# Patient Record
Sex: FEMALE | Race: WHITE | URBAN - METROPOLITAN AREA
[De-identification: names, ages, dates, MRNs, and addresses within clinical notes are randomized per-mention and may not be internally consistent; named-entity substitution may affect disease eponyms.]

---

## 2021-07-26 ENCOUNTER — HOSPITAL ENCOUNTER (OUTPATIENT)
Age: 31
Discharge: HOME | End: 2021-07-26
Attending: LEGAL MEDICINE | Admitting: LEGAL MEDICINE
Payer: COMMERCIAL

## 2021-07-26 ENCOUNTER — APPOINTMENT (OUTPATIENT)
Dept: LABOR AND DELIVERY | Age: 31
End: 2021-07-26
Payer: COMMERCIAL

## 2021-07-26 VITALS
DIASTOLIC BLOOD PRESSURE: 79 MMHG | SYSTOLIC BLOOD PRESSURE: 129 MMHG | HEART RATE: 97 BPM | TEMPERATURE: 98 F | RESPIRATION RATE: 16 BRPM

## 2021-07-26 PROCEDURE — 59025 FETAL NON-STRESS TEST: CPT

## 2021-07-29 ENCOUNTER — HOSPITAL ENCOUNTER (OUTPATIENT)
Age: 31
Discharge: HOME | End: 2021-07-29
Attending: LEGAL MEDICINE | Admitting: LEGAL MEDICINE
Payer: COMMERCIAL

## 2021-07-29 ENCOUNTER — APPOINTMENT (OUTPATIENT)
Dept: LABOR AND DELIVERY | Age: 31
End: 2021-07-29
Payer: COMMERCIAL

## 2021-07-29 VITALS
SYSTOLIC BLOOD PRESSURE: 128 MMHG | DIASTOLIC BLOOD PRESSURE: 61 MMHG | TEMPERATURE: 97.8 F | RESPIRATION RATE: 16 BRPM | HEART RATE: 100 BPM

## 2021-07-29 PROCEDURE — 59025 FETAL NON-STRESS TEST: CPT

## 2021-08-02 ENCOUNTER — HOSPITAL ENCOUNTER (OUTPATIENT)
Age: 31
Discharge: HOME | End: 2021-08-02
Attending: LEGAL MEDICINE | Admitting: LEGAL MEDICINE
Payer: COMMERCIAL

## 2021-08-02 VITALS
DIASTOLIC BLOOD PRESSURE: 65 MMHG | HEART RATE: 79 BPM | TEMPERATURE: 98.1 F | RESPIRATION RATE: 18 BRPM | SYSTOLIC BLOOD PRESSURE: 122 MMHG

## 2021-08-02 PROCEDURE — 59025 FETAL NON-STRESS TEST: CPT

## 2021-08-05 ENCOUNTER — HOSPITAL ENCOUNTER (OUTPATIENT)
Age: 31
Discharge: HOME | End: 2021-08-05
Attending: LEGAL MEDICINE | Admitting: LEGAL MEDICINE
Payer: COMMERCIAL

## 2021-08-05 ENCOUNTER — APPOINTMENT (OUTPATIENT)
Dept: LABOR AND DELIVERY | Age: 31
End: 2021-08-05
Payer: COMMERCIAL

## 2021-08-05 VITALS
RESPIRATION RATE: 16 BRPM | DIASTOLIC BLOOD PRESSURE: 80 MMHG | SYSTOLIC BLOOD PRESSURE: 130 MMHG | TEMPERATURE: 98 F | HEART RATE: 85 BPM

## 2021-08-05 PROCEDURE — 59025 FETAL NON-STRESS TEST: CPT

## 2021-08-09 ENCOUNTER — APPOINTMENT (OUTPATIENT)
Dept: LABOR AND DELIVERY | Age: 31
End: 2021-08-09
Payer: COMMERCIAL

## 2021-08-09 ENCOUNTER — ANESTHESIA EVENT (OUTPATIENT)
Dept: LABOR AND DELIVERY | Age: 31
End: 2021-08-09
Payer: COMMERCIAL

## 2021-08-09 ENCOUNTER — ANESTHESIA (OUTPATIENT)
Dept: LABOR AND DELIVERY | Age: 31
End: 2021-08-09
Payer: COMMERCIAL

## 2021-08-09 ENCOUNTER — HOSPITAL ENCOUNTER (INPATIENT)
Age: 31
LOS: 2 days | Discharge: HOME | End: 2021-08-11
Attending: LEGAL MEDICINE | Admitting: LEGAL MEDICINE
Payer: COMMERCIAL

## 2021-08-09 PROBLEM — Z34.90 TERM PREGNANCY: Status: ACTIVE | Noted: 2021-08-09

## 2021-08-09 LAB
ABO/RH: NORMAL
ALBUMIN SERPL-MCNC: 3.4 G/DL (ref 3.5–5.2)
ALP BLD-CCNC: 138 U/L (ref 35–104)
ALT SERPL-CCNC: 7 U/L (ref 0–32)
AMPHETAMINE SCREEN, URINE: NOT DETECTED
ANION GAP SERPL CALCULATED.3IONS-SCNC: 14 MMOL/L (ref 7–16)
ANTIBODY SCREEN: NORMAL
AST SERPL-CCNC: 14 U/L (ref 0–31)
BARBITURATE SCREEN URINE: NOT DETECTED
BENZODIAZEPINE SCREEN, URINE: NOT DETECTED
BILIRUB SERPL-MCNC: <0.2 MG/DL (ref 0–1.2)
BILIRUBIN URINE: NEGATIVE
BLOOD, URINE: NEGATIVE
BUN BLDV-MCNC: 8 MG/DL (ref 6–20)
CALCIUM SERPL-MCNC: 9.8 MG/DL (ref 8.6–10.2)
CANNABINOID SCREEN URINE: NOT DETECTED
CHLORIDE BLD-SCNC: 104 MMOL/L (ref 98–107)
CLARITY: CLEAR
CO2: 20 MMOL/L (ref 22–29)
COCAINE METABOLITE SCREEN URINE: NOT DETECTED
COLOR: YELLOW
CREAT SERPL-MCNC: 0.6 MG/DL (ref 0.5–1)
FENTANYL SCREEN, URINE: NOT DETECTED
GFR AFRICAN AMERICAN: >60
GFR NON-AFRICAN AMERICAN: >60 ML/MIN/1.73
GLUCOSE BLD-MCNC: 73 MG/DL (ref 74–99)
GLUCOSE URINE: NEGATIVE MG/DL
HCT VFR BLD CALC: 34.1 % (ref 34–48)
HEMOGLOBIN: 11.7 G/DL (ref 11.5–15.5)
KETONES, URINE: NEGATIVE MG/DL
LEUKOCYTE ESTERASE, URINE: NEGATIVE
Lab: NORMAL
MCH RBC QN AUTO: 31.5 PG (ref 26–35)
MCHC RBC AUTO-ENTMCNC: 34.3 % (ref 32–34.5)
MCV RBC AUTO: 91.9 FL (ref 80–99.9)
METHADONE SCREEN, URINE: NOT DETECTED
NITRITE, URINE: NEGATIVE
OPIATE SCREEN URINE: NOT DETECTED
OXYCODONE URINE: NOT DETECTED
PDW BLD-RTO: 13.2 FL (ref 11.5–15)
PH UA: 7 (ref 5–9)
PHENCYCLIDINE SCREEN URINE: NOT DETECTED
PLATELET # BLD: 224 E9/L (ref 130–450)
PMV BLD AUTO: 11 FL (ref 7–12)
POTASSIUM SERPL-SCNC: 4 MMOL/L (ref 3.5–5)
PROTEIN UA: NEGATIVE MG/DL
RBC # BLD: 3.71 E12/L (ref 3.5–5.5)
SODIUM BLD-SCNC: 138 MMOL/L (ref 132–146)
SPECIFIC GRAVITY UA: 1.01 (ref 1–1.03)
TOTAL PROTEIN: 6.5 G/DL (ref 6.4–8.3)
UROBILINOGEN, URINE: 0.2 E.U./DL
WBC # BLD: 12.1 E9/L (ref 4.5–11.5)

## 2021-08-09 PROCEDURE — 81003 URINALYSIS AUTO W/O SCOPE: CPT

## 2021-08-09 PROCEDURE — 80053 COMPREHEN METABOLIC PANEL: CPT

## 2021-08-09 PROCEDURE — 86900 BLOOD TYPING SEROLOGIC ABO: CPT

## 2021-08-09 PROCEDURE — 2580000003 HC RX 258: Performed by: LEGAL MEDICINE

## 2021-08-09 PROCEDURE — 88307 TISSUE EXAM BY PATHOLOGIST: CPT

## 2021-08-09 PROCEDURE — 2500000003 HC RX 250 WO HCPCS: Performed by: ANESTHESIOLOGY

## 2021-08-09 PROCEDURE — 80307 DRUG TEST PRSMV CHEM ANLYZR: CPT

## 2021-08-09 PROCEDURE — 1220000001 HC SEMI PRIVATE L&D R&B

## 2021-08-09 PROCEDURE — 3700000025 EPIDURAL BLOCK: Performed by: ANESTHESIOLOGY

## 2021-08-09 PROCEDURE — 86901 BLOOD TYPING SEROLOGIC RH(D): CPT

## 2021-08-09 PROCEDURE — 7200000001 HC VAGINAL DELIVERY

## 2021-08-09 PROCEDURE — 2500000003 HC RX 250 WO HCPCS: Performed by: LEGAL MEDICINE

## 2021-08-09 PROCEDURE — 86850 RBC ANTIBODY SCREEN: CPT

## 2021-08-09 PROCEDURE — 85027 COMPLETE CBC AUTOMATED: CPT

## 2021-08-09 PROCEDURE — 36415 COLL VENOUS BLD VENIPUNCTURE: CPT

## 2021-08-09 RX ORDER — SODIUM CHLORIDE 0.9 % (FLUSH) 0.9 %
5-40 SYRINGE (ML) INJECTION PRN
Status: DISCONTINUED | OUTPATIENT
Start: 2021-08-09 | End: 2021-08-10

## 2021-08-09 RX ORDER — ONDANSETRON 2 MG/ML
4 INJECTION INTRAMUSCULAR; INTRAVENOUS EVERY 6 HOURS PRN
Status: DISCONTINUED | OUTPATIENT
Start: 2021-08-09 | End: 2021-08-09 | Stop reason: SDUPTHER

## 2021-08-09 RX ORDER — SODIUM CHLORIDE, SODIUM LACTATE, POTASSIUM CHLORIDE, AND CALCIUM CHLORIDE .6; .31; .03; .02 G/100ML; G/100ML; G/100ML; G/100ML
500 INJECTION, SOLUTION INTRAVENOUS PRN
Status: DISCONTINUED | OUTPATIENT
Start: 2021-08-09 | End: 2021-08-10

## 2021-08-09 RX ORDER — DEXTROSE, SODIUM CHLORIDE, SODIUM LACTATE, POTASSIUM CHLORIDE, AND CALCIUM CHLORIDE 5; .6; .31; .03; .02 G/100ML; G/100ML; G/100ML; G/100ML; G/100ML
INJECTION, SOLUTION INTRAVENOUS CONTINUOUS
Status: DISCONTINUED | OUTPATIENT
Start: 2021-08-09 | End: 2021-08-10

## 2021-08-09 RX ORDER — ONDANSETRON 2 MG/ML
4 INJECTION INTRAMUSCULAR; INTRAVENOUS EVERY 6 HOURS PRN
Status: DISCONTINUED | OUTPATIENT
Start: 2021-08-09 | End: 2021-08-10

## 2021-08-09 RX ORDER — SODIUM CHLORIDE 0.9 % (FLUSH) 0.9 %
5-40 SYRINGE (ML) INJECTION EVERY 12 HOURS SCHEDULED
Status: DISCONTINUED | OUTPATIENT
Start: 2021-08-09 | End: 2021-08-10

## 2021-08-09 RX ORDER — LEVOTHYROXINE SODIUM 0.05 MG/1
50 TABLET ORAL DAILY
COMMUNITY

## 2021-08-09 RX ORDER — SODIUM CHLORIDE, SODIUM LACTATE, POTASSIUM CHLORIDE, AND CALCIUM CHLORIDE .6; .31; .03; .02 G/100ML; G/100ML; G/100ML; G/100ML
1000 INJECTION, SOLUTION INTRAVENOUS PRN
Status: DISCONTINUED | OUTPATIENT
Start: 2021-08-09 | End: 2021-08-10

## 2021-08-09 RX ORDER — SODIUM CHLORIDE, SODIUM LACTATE, POTASSIUM CHLORIDE, CALCIUM CHLORIDE 600; 310; 30; 20 MG/100ML; MG/100ML; MG/100ML; MG/100ML
INJECTION, SOLUTION INTRAVENOUS CONTINUOUS
Status: DISCONTINUED | OUTPATIENT
Start: 2021-08-09 | End: 2021-08-10

## 2021-08-09 RX ORDER — LIDOCAINE HYDROCHLORIDE 10 MG/ML
30 INJECTION, SOLUTION EPIDURAL; INFILTRATION; INTRACAUDAL; PERINEURAL PRN
Status: DISCONTINUED | OUTPATIENT
Start: 2021-08-09 | End: 2021-08-10

## 2021-08-09 RX ORDER — SODIUM CHLORIDE 9 MG/ML
25 INJECTION, SOLUTION INTRAVENOUS PRN
Status: DISCONTINUED | OUTPATIENT
Start: 2021-08-09 | End: 2021-08-10

## 2021-08-09 RX ORDER — LIDOCAINE HYDROCHLORIDE 10 MG/ML
INJECTION, SOLUTION EPIDURAL; INFILTRATION; INTRACAUDAL; PERINEURAL
Status: DISPENSED
Start: 2021-08-09 | End: 2021-08-09

## 2021-08-09 RX ORDER — NALOXONE HYDROCHLORIDE 0.4 MG/ML
0.4 INJECTION, SOLUTION INTRAMUSCULAR; INTRAVENOUS; SUBCUTANEOUS PRN
Status: DISCONTINUED | OUTPATIENT
Start: 2021-08-09 | End: 2021-08-10

## 2021-08-09 RX ADMIN — SODIUM CHLORIDE, POTASSIUM CHLORIDE, SODIUM LACTATE AND CALCIUM CHLORIDE 1000 ML: 600; 310; 30; 20 INJECTION, SOLUTION INTRAVENOUS at 12:53

## 2021-08-09 RX ADMIN — FAMOTIDINE 20 MG: 10 INJECTION INTRAVENOUS at 15:28

## 2021-08-09 RX ADMIN — Medication 15 ML: at 13:45

## 2021-08-09 RX ADMIN — SODIUM CHLORIDE, POTASSIUM CHLORIDE, SODIUM LACTATE AND CALCIUM CHLORIDE: 600; 310; 30; 20 INJECTION, SOLUTION INTRAVENOUS at 14:15

## 2021-08-09 RX ADMIN — SODIUM CHLORIDE, POTASSIUM CHLORIDE, SODIUM LACTATE AND CALCIUM CHLORIDE 1000 ML: 600; 310; 30; 20 INJECTION, SOLUTION INTRAVENOUS at 11:56

## 2021-08-09 RX ADMIN — SODIUM CHLORIDE, POTASSIUM CHLORIDE, SODIUM LACTATE AND CALCIUM CHLORIDE: 600; 310; 30; 20 INJECTION, SOLUTION INTRAVENOUS at 18:51

## 2021-08-09 RX ADMIN — Medication 15 ML/HR: at 13:55

## 2021-08-09 ASSESSMENT — PAIN SCALES - GENERAL: PAINLEVEL_OUTOF10: 0

## 2021-08-09 NOTE — PROGRESS NOTES
Dr. Kesha Hancock notified that patient is now comfortable with epidural and catheter has been placed. She is 4cm and josé manuel every 2 min. Pts only complaint is heartburn. Orders received for pepcid and no need for pitocin unless contractions space to furthur apart than every 3 minutes.

## 2021-08-09 NOTE — PROGRESS NOTES
Pt is a  who is here with complaints of contractions. She is 39 1/7 weeks pregnant. Denies any leaking of fluid or bleeding. Pt is uncomfortable. Admitted to room 2 and oriented to room.

## 2021-08-09 NOTE — ANESTHESIA PROCEDURE NOTES
Epidural Block    Patient location during procedure: OB  Start time: 8/9/2021 2:35 PM  End time: 8/9/2021 2:50 PM  Reason for block: labor epidural  Staffing  Performed: resident/CRNA   Anesthesiologist: Giovanni Garcia MD  Resident/CRNA: JANNIE Peterson CRNA  Preanesthetic Checklist  Completed: patient identified, IV checked, site marked, risks and benefits discussed, surgical consent, monitors and equipment checked, pre-op evaluation, timeout performed, anesthesia consent given, oxygen available and patient being monitored  Epidural  Patient position: sitting  Prep: Betadine and ChloraPrep  Patient monitoring: cardiac monitor, continuous pulse ox and frequent blood pressure checks  Approach: midline  Location: lumbar (1-5)  Injection technique: KAELA air  Provider prep: mask and sterile gloves  Needle  Needle type: Tuohy   Needle gauge: 18 G  Needle length: 2.5 in  Needle insertion depth: 8 cm  Catheter type: end hole  Catheter size: 18 G (20 G)  Catheter at skin depth: 14 cm  Test dose: negative  Assessment  Hemodynamics: stable  Attempts: 1

## 2021-08-09 NOTE — PROGRESS NOTES
Cervical exam done. Dr. Lauren Carpenter notified of pts arrival, VS, EFM and cervical exam. Orders received for admission.

## 2021-08-09 NOTE — ANESTHESIA PRE PROCEDURE
Department of Anesthesiology  Preprocedure Note       Name:  Charles Griffith   Age:  35 y.o.  :  1990                                          MRN:  [id number]         Date:  2021      Surgeon: * No surgeons listed *    Procedure: * No procedures listed *    Medications prior to admission:   Prior to Admission medications    Medication Sig Start Date End Date Taking?  Authorizing Provider   levothyroxine (SYNTHROID) 50 MCG tablet Take 50 mcg by mouth Daily   Yes Historical Provider, MD       Current medications:    Current Facility-Administered Medications   Medication Dose Route Frequency Provider Last Rate Last Admin    lidocaine PF 1 % injection             dextrose 5 % in lactated ringers infusion   Intravenous Continuous Sarahi Bal MD        lactated ringers infusion   Intravenous Continuous Sarahi Bal  mL/hr at 21 1415 New Bag at 21 1415    lactated ringers bolus  500 mL Intravenous PRN Sarahi Bal MD        Or    lactated ringers bolus  1,000 mL Intravenous PRN Sarahi Bal  mL/hr at 21 1253 1,000 mL at 21 1253    sodium chloride flush 0.9 % injection 5-40 mL  5-40 mL Intravenous 2 times per day Sarahi Bal MD        sodium chloride flush 0.9 % injection 5-40 mL  5-40 mL Intravenous PRN Sarahi Bal MD        0.9 % sodium chloride infusion  25 mL Intravenous PRN Sarahi Bal MD        oxytocin (PITOCIN) 30 units in 500 mL infusion  1 arnulfo-units/min Intravenous Continuous Sarahi Bal MD        oxytocin (PITOCIN) 30 units in 500 mL infusion  10 Units Intravenous PRN Sarahi Bal MD        lidocaine PF 1 % injection 30 mL  30 mL Other PRN Sarahi Bal MD        hydrocortisone 2.5 % cream   Topical Q2H PRN Sarahi Bal MD        ondansetron (ZOFRAN) injection 4 mg  4 mg Intravenous Q6H PRN Sarahi Bal MD        naloxone (NARCAN) injection 0.4 mg  0.4 mg Intravenous PRN Rachel Bautista MD        fentaNYL 1.85mcg/ml and Bupivicaine 0.1% in 0.9% NS 135ml infusion (OB) epidural  15 mL/hr Epidural Continuous Palmer Taylor MD 15 mL/hr at 08/09/21 1355 15 mL/hr at 08/09/21 1355    famotidine (PEPCID) injection 20 mg  20 mg Intravenous BID Sabina Burnham MD   20 mg at 08/09/21 1528       Allergies: Allergies   Allergen Reactions    Alavert [Loratadine]     Pcn [Penicillins] Swelling       Problem List:    Patient Active Problem List   Diagnosis Code    Term pregnancy Z34.90       Past Medical History:  No past medical history on file. Past Surgical History:  No past surgical history on file.     Social History:    Social History     Tobacco Use    Smoking status: Current Every Day Smoker    Smokeless tobacco: Current User   Substance Use Topics    Alcohol use: Not on file                                Ready to quit: Not Answered  Counseling given: Not Answered      Vital Signs (Current):   Vitals:    08/09/21 1530 08/09/21 1600 08/09/21 1630 08/09/21 1700   BP: (!) 112/59 118/65 111/61 129/66   Pulse: 76 75 75 77   Resp: 18 18 18 18   Temp:    98.9 °F (37.2 °C)   SpO2:                                                  BP Readings from Last 3 Encounters:   08/09/21 129/66   08/05/21 130/80   08/02/21 122/65       NPO Status:                                                                                 BMI:   Wt Readings from Last 3 Encounters:   No data found for Wt     There is no height or weight on file to calculate BMI.    CBC:   Lab Results   Component Value Date    WBC 12.1 08/09/2021    RBC 3.71 08/09/2021    HGB 11.7 08/09/2021    HCT 34.1 08/09/2021    MCV 91.9 08/09/2021    RDW 13.2 08/09/2021     08/09/2021       CMP:   Lab Results   Component Value Date     08/09/2021    K 4.0 08/09/2021     08/09/2021    CO2 20 08/09/2021    BUN 8 08/09/2021    CREATININE 0.6 08/09/2021    GFRAA >60 08/09/2021    LABGLOM >60 08/09/2021    GLUCOSE 73 08/09/2021    PROT 6.5 08/09/2021 CALCIUM 9.8 08/09/2021    BILITOT <0.2 08/09/2021    ALKPHOS 138 08/09/2021    AST 14 08/09/2021    ALT 7 08/09/2021       POC Tests: No results for input(s): POCGLU, POCNA, POCK, POCCL, POCBUN, POCHEMO, POCHCT in the last 72 hours. Coags: No results found for: PROTIME, INR, APTT    HCG (If Applicable): No results found for: PREGTESTUR, PREGSERUM, HCG, HCGQUANT     ABGs: No results found for: PHART, PO2ART, KEE8PME, MSX2PBG, BEART, P1CCZMZH     Type & Screen (If Applicable):  No results found for: LABABO, LABRH    Drug/Infectious Status (If Applicable):  No results found for: HIV, HEPCAB    COVID-19 Screening (If Applicable): No results found for: COVID19        Anesthesia Evaluation    Airway: Mallampati: III  TM distance: >3 FB   Neck ROM: full  Mouth opening: > = 3 FB Dental:          Pulmonary: breath sounds clear to auscultation                             Cardiovascular:            Rhythm: regular  Rate: normal                    Neuro/Psych:               GI/Hepatic/Renal:             Endo/Other:                     Abdominal:             Vascular: Other Findings:             Anesthesia Plan      epidural     ASA 2             Anesthetic plan and risks discussed with patient. Plan discussed with CRNA.                   David Espana MD   8/9/2021

## 2021-08-09 NOTE — ANESTHESIA PRE PROCEDURE
Department of Anesthesiology  Preprocedure Note       Name:  Keith Aaron   Age:  35 y.o.  :  1990                                          MRN:  [id number]         Date:  2021      Surgeon: * No surgeons listed *    Procedure: * No procedures listed *    Medications prior to admission:   Prior to Admission medications    Not on File       Current medications:    Current Facility-Administered Medications   Medication Dose Route Frequency Provider Last Rate Last Admin    lidocaine PF 1 % injection             dextrose 5 % in lactated ringers infusion   Intravenous Continuous Venkat Anderson MD        lactated ringers infusion   Intravenous Continuous Venkat Anderson MD        lactated ringers bolus  500 mL Intravenous PRN Venkat Anderson MD        Or    lactated ringers bolus  1,000 mL Intravenous PRN Venkat Anderson  mL/hr at 21 1253 1,000 mL at 21 1253    sodium chloride flush 0.9 % injection 5-40 mL  5-40 mL Intravenous 2 times per day Venkat Anderson MD        sodium chloride flush 0.9 % injection 5-40 mL  5-40 mL Intravenous PRN Venkat Anderson MD        0.9 % sodium chloride infusion  25 mL Intravenous PRN Venkat Anderson MD        oxytocin (PITOCIN) 30 units in 500 mL infusion  1 arnulfo-units/min Intravenous Continuous Venkat Anderson MD        oxytocin (PITOCIN) 30 units in 500 mL infusion  10 Units Intravenous PRN Venkat Anderson MD        And    oxytocin (PITOCIN) 30 units in 500 mL infusion  87.3 arnulfo-units/min Intravenous Continuous PRN Venkat Anderson MD        lidocaine PF 1 % injection 30 mL  30 mL Other PRN Venkat Anderson MD        hydrocortisone 2.5 % cream   Topical Q2H PRN Venkat Anderson MD        ondansetron (ZOFRAN) injection 4 mg  4 mg Intravenous Q6H PRN Venkat Anderson MD           Allergies:  Not on File    Problem List:    Patient Active Problem List   Diagnosis Code    Term pregnancy Z34.90       Past Medical History:  No past medical history on file. Past Surgical History:  No past surgical history on file. Social History:    Social History     Tobacco Use    Smoking status: Current Every Day Smoker    Smokeless tobacco: Current User   Substance Use Topics    Alcohol use: Not on file                                Ready to quit: Not Answered  Counseling given: Not Answered      Vital Signs (Current):   Vitals:    08/09/21 1107   BP: 130/75   Pulse: 80                                              BP Readings from Last 3 Encounters:   08/09/21 130/75   08/05/21 130/80   08/02/21 122/65       NPO Status:                                                                                 BMI:   Wt Readings from Last 3 Encounters:   No data found for Wt     There is no height or weight on file to calculate BMI.    CBC:   Lab Results   Component Value Date    WBC 12.1 08/09/2021    RBC 3.71 08/09/2021    HGB 11.7 08/09/2021    HCT 34.1 08/09/2021    MCV 91.9 08/09/2021    RDW 13.2 08/09/2021     08/09/2021       CMP: No results found for: NA, K, CL, CO2, BUN, CREATININE, GFRAA, AGRATIO, LABGLOM, GLUCOSE, PROT, CALCIUM, BILITOT, ALKPHOS, AST, ALT    POC Tests: No results for input(s): POCGLU, POCNA, POCK, POCCL, POCBUN, POCHEMO, POCHCT in the last 72 hours.     Coags: No results found for: PROTIME, INR, APTT    HCG (If Applicable): No results found for: PREGTESTUR, PREGSERUM, HCG, HCGQUANT     ABGs: No results found for: PHART, PO2ART, PMK5AKU, WSE5IDL, BEART, B9OCZFVH     Type & Screen (If Applicable):  No results found for: LABABO, LABRH    Drug/Infectious Status (If Applicable):  No results found for: HIV, HEPCAB    COVID-19 Screening (If Applicable): No results found for: COVID19        Anesthesia Evaluation  Patient summary reviewed and Nursing notes reviewed  Airway:         Dental: normal exam         Pulmonary:Negative Pulmonary ROS and normal exam                               Cardiovascular:Negative CV ROS                      Neuro/Psych: Negative Neuro/Psych ROS              GI/Hepatic/Renal: Neg GI/Hepatic/Renal ROS            Endo/Other: Negative Endo/Other ROS                    Abdominal:             Vascular: Other Findings:             Anesthesia Plan      epidural     ASA 3                 Plan discussed with attending.                 Cortney Nunez APRN - CRNA   8/9/2021

## 2021-08-10 PROBLEM — Z37.9 NORMAL LABOR: Status: ACTIVE | Noted: 2021-08-10

## 2021-08-10 LAB — HEMOGLOBIN: 10.5 G/DL (ref 11.5–15.5)

## 2021-08-10 PROCEDURE — 85018 HEMOGLOBIN: CPT

## 2021-08-10 PROCEDURE — 1220000001 HC SEMI PRIVATE L&D R&B

## 2021-08-10 PROCEDURE — 36415 COLL VENOUS BLD VENIPUNCTURE: CPT

## 2021-08-10 PROCEDURE — 6370000000 HC RX 637 (ALT 250 FOR IP): Performed by: LEGAL MEDICINE

## 2021-08-10 PROCEDURE — 6370000000 HC RX 637 (ALT 250 FOR IP)

## 2021-08-10 RX ORDER — IBUPROFEN 400 MG/1
TABLET ORAL
Status: COMPLETED
Start: 2021-08-10 | End: 2021-08-10

## 2021-08-10 RX ORDER — FAMOTIDINE 20 MG/1
20 TABLET, FILM COATED ORAL 2 TIMES DAILY
Status: DISCONTINUED | OUTPATIENT
Start: 2021-08-10 | End: 2021-08-11 | Stop reason: HOSPADM

## 2021-08-10 RX ORDER — DOCUSATE SODIUM 100 MG/1
CAPSULE, LIQUID FILLED ORAL
Status: COMPLETED
Start: 2021-08-10 | End: 2021-08-10

## 2021-08-10 RX ORDER — OXYCODONE HYDROCHLORIDE 5 MG/1
5 TABLET ORAL EVERY 4 HOURS PRN
Status: DISCONTINUED | OUTPATIENT
Start: 2021-08-10 | End: 2021-08-11 | Stop reason: HOSPADM

## 2021-08-10 RX ORDER — SODIUM CHLORIDE 0.9 % (FLUSH) 0.9 %
10 SYRINGE (ML) INJECTION EVERY 12 HOURS SCHEDULED
Status: DISCONTINUED | OUTPATIENT
Start: 2021-08-10 | End: 2021-08-11 | Stop reason: HOSPADM

## 2021-08-10 RX ORDER — SODIUM CHLORIDE 0.9 % (FLUSH) 0.9 %
10 SYRINGE (ML) INJECTION PRN
Status: DISCONTINUED | OUTPATIENT
Start: 2021-08-10 | End: 2021-08-11 | Stop reason: HOSPADM

## 2021-08-10 RX ORDER — IBUPROFEN 400 MG/1
400 TABLET ORAL
Status: DISCONTINUED | OUTPATIENT
Start: 2021-08-10 | End: 2021-08-11 | Stop reason: HOSPADM

## 2021-08-10 RX ORDER — LEVOTHYROXINE SODIUM 0.05 MG/1
50 TABLET ORAL DAILY
Status: DISCONTINUED | OUTPATIENT
Start: 2021-08-10 | End: 2021-08-11 | Stop reason: HOSPADM

## 2021-08-10 RX ORDER — FERROUS SULFATE 325(65) MG
325 TABLET ORAL
Status: DISCONTINUED | OUTPATIENT
Start: 2021-08-10 | End: 2021-08-11 | Stop reason: HOSPADM

## 2021-08-10 RX ORDER — ACETAMINOPHEN 500 MG
1000 TABLET ORAL EVERY 6 HOURS PRN
Status: DISCONTINUED | OUTPATIENT
Start: 2021-08-10 | End: 2021-08-11 | Stop reason: HOSPADM

## 2021-08-10 RX ORDER — MODIFIED LANOLIN
OINTMENT (GRAM) TOPICAL
Status: COMPLETED
Start: 2021-08-10 | End: 2021-08-10

## 2021-08-10 RX ORDER — MODIFIED LANOLIN
OINTMENT (GRAM) TOPICAL PRN
Status: DISCONTINUED | OUTPATIENT
Start: 2021-08-10 | End: 2021-08-11 | Stop reason: HOSPADM

## 2021-08-10 RX ORDER — SODIUM CHLORIDE, SODIUM LACTATE, POTASSIUM CHLORIDE, CALCIUM CHLORIDE 600; 310; 30; 20 MG/100ML; MG/100ML; MG/100ML; MG/100ML
INJECTION, SOLUTION INTRAVENOUS CONTINUOUS
Status: DISCONTINUED | OUTPATIENT
Start: 2021-08-10 | End: 2021-08-11 | Stop reason: HOSPADM

## 2021-08-10 RX ORDER — DOCUSATE SODIUM 100 MG/1
100 CAPSULE, LIQUID FILLED ORAL 2 TIMES DAILY
Status: DISCONTINUED | OUTPATIENT
Start: 2021-08-10 | End: 2021-08-11 | Stop reason: HOSPADM

## 2021-08-10 RX ORDER — SODIUM CHLORIDE 9 MG/ML
25 INJECTION, SOLUTION INTRAVENOUS PRN
Status: DISCONTINUED | OUTPATIENT
Start: 2021-08-10 | End: 2021-08-11 | Stop reason: HOSPADM

## 2021-08-10 RX ADMIN — DOCUSATE SODIUM 100 MG: 100 CAPSULE, LIQUID FILLED ORAL at 00:51

## 2021-08-10 RX ADMIN — IBUPROFEN 400 MG: 400 TABLET, FILM COATED ORAL at 00:51

## 2021-08-10 RX ADMIN — DOCUSATE SODIUM 100 MG: 100 CAPSULE, LIQUID FILLED ORAL at 21:12

## 2021-08-10 RX ADMIN — IBUPROFEN 400 MG: 400 TABLET, FILM COATED ORAL at 17:15

## 2021-08-10 RX ADMIN — Medication: at 00:52

## 2021-08-10 RX ADMIN — LEVOTHYROXINE SODIUM 50 MCG: 0.05 TABLET ORAL at 08:16

## 2021-08-10 RX ADMIN — IBUPROFEN 400 MG: 400 TABLET, FILM COATED ORAL at 08:16

## 2021-08-10 RX ADMIN — FAMOTIDINE 20 MG: 20 TABLET ORAL at 21:13

## 2021-08-10 RX ADMIN — DOCUSATE SODIUM 100 MG: 100 CAPSULE, LIQUID FILLED ORAL at 08:16

## 2021-08-10 RX ADMIN — IBUPROFEN 400 MG: 400 TABLET, FILM COATED ORAL at 11:28

## 2021-08-10 RX ADMIN — FAMOTIDINE 20 MG: 20 TABLET ORAL at 11:28

## 2021-08-10 RX ADMIN — IBUPROFEN 400 MG: 400 TABLET, FILM COATED ORAL at 21:13

## 2021-08-10 RX ADMIN — BENZOCAINE AND LEVOMENTHOL: 200; 5 SPRAY TOPICAL at 00:52

## 2021-08-10 ASSESSMENT — PAIN SCALES - GENERAL
PAINLEVEL_OUTOF10: 3
PAINLEVEL_OUTOF10: 5
PAINLEVEL_OUTOF10: 3
PAINLEVEL_OUTOF10: 0
PAINLEVEL_OUTOF10: 0
PAINLEVEL_OUTOF10: 5
PAINLEVEL_OUTOF10: 0
PAINLEVEL_OUTOF10: 5

## 2021-08-10 ASSESSMENT — PAIN - FUNCTIONAL ASSESSMENT: PAIN_FUNCTIONAL_ASSESSMENT: ACTIVITIES ARE NOT PREVENTED

## 2021-08-10 ASSESSMENT — PAIN DESCRIPTION - LOCATION: LOCATION: ABDOMEN

## 2021-08-10 ASSESSMENT — PAIN DESCRIPTION - ONSET: ONSET: GRADUAL

## 2021-08-10 ASSESSMENT — PAIN DESCRIPTION - PROGRESSION: CLINICAL_PROGRESSION: NOT CHANGED

## 2021-08-10 ASSESSMENT — PAIN DESCRIPTION - FREQUENCY: FREQUENCY: INTERMITTENT

## 2021-08-10 ASSESSMENT — PAIN DESCRIPTION - DESCRIPTORS: DESCRIPTORS: ACHING;CRAMPING;DISCOMFORT

## 2021-08-10 ASSESSMENT — PAIN DESCRIPTION - PAIN TYPE: TYPE: ACUTE PAIN

## 2021-08-10 NOTE — PROGRESS NOTES
Recovery completed. Pt ambulated to BR. Showered and voided. Plan of care for night discussed. Pt verbalizes understanding. Medicated per order. Rest encouraged.

## 2021-08-10 NOTE — PROGRESS NOTES
Safe sleep & Shaken Baby videos watched. Verbalized understanding. No questions verbalized when asked.

## 2021-08-10 NOTE — PROGRESS NOTES
Patient actively pushing. RN remain in continuous attendance at bedside. Assessment & evaluation of FHR ongoing via continuous EFM.

## 2021-08-10 NOTE — OP NOTE
[address]                                OPERATIVE REPORT    PATIENT NAME: Jatin Engel                   :        1990  MED REC NO:   [id number]                            ROOM:       02  ACCOUNT NO:   [de-identified]                           ADMIT DATE: 2021. PROVIDER:     Christin Syed MD    DATE OF PROCEDURE:  2021    SURGEON:  Christin Syed MD    The patient felt the urge to push. She was placed in [place]  position. She was able to deliver the fetal head. Fetal trunk was  delivered with minimal traction applied in an axis parallel to the fetal  spine after the shoulder had cleared the pubic bone. Delayed cord  clamping was performed. Nares and hypopharynx were suctioned. Cord was  clamped and cut. Infant was crying and vigorous. Cord gases and blood  samples were obtained. Note was made of moderate meconium. Placenta  was removed spontaneously. Note was made of an intact two artery, one  vein cord placenta which was sent to Pathology. No cervical or vaginal  lacerations were noted. Superficial left labial laceration was  hemostatic and not repaired. Fundus was firm. Estimated blood loss was  300 mL. Bedside exam of the infant revealed no gross abnormalities. The cord arterial gas pH was 7.274, base excess -5. Cord venous gas pH  7.316, base excess -5.7. Pediatrician is Dr. Martín Tate. Mother and  infant went to recovery room in stable condition.         Ebony Lemus MD    D: 2021 22:29:23       T: 2021 22:31:28     [email or web address]  Job#: [id number]     Doc#: [id number]    CC:

## 2021-08-10 NOTE — DISCHARGE SUMMARY
[address]                               DISCHARGE SUMMARY    PATIENT NAME: Sasha Perez                   :        1990  MED REC NO:   [id number]                            ROOM:       0215  ACCOUNT NO:   [de-identified]                           ADMIT DATE: 2021. PROVIDER:     Dannielle Martinez MD                  DISCHARGE DATE:      ADMITTING DIAGNOSIS:  Intrauterine pregnancy at term in labor. DISCHARGE DIAGNOSIS:  Intrauterine pregnancy at term in labor. PROCEDURE PERFORMED:  Vaginal delivery. HOSPITAL COURSE IN DETAIL:  The patient is doing well. Voiding well. Minimal lochia. Wishes to go home tomorrow. Admission labs revealed a  creatinine of 0.6, ALT 7, AST 14, glucose 73. White count 12.1,  hemoglobin 11.7, platelets 634,004. Postpartum day #1 hemoglobin is  10.5. She is afebrile. Heart rate is 80-89. Blood pressure 110-136  over 70-58. Fundus is firm and two fingerbreadths below the umbilicus. Perineum dry and intact. ASSESSMENT:  The patient doing well, postpartum day #1. Stable. Wishes  to go home tomorrow. Denies any PIH symptoms. She did have some  elevated blood pressures of 145/68, 151/73, 151/77 and 144/68 between  2200 to midnight. However, pressures since then have been within normal  limits. She is denying any PIH symptoms. The patient is stable. PLAN:  Home tomorrow if meets discharge criteria with fever, bleeding,  emboli, lifting, climbing, driving precautions. Home with PIH  precautions. She is to follow up at the office in 6 weeks. She  indicates understanding of all instructions.         Aracely Suh MD    D: 08/10/2021 18:48:05       T: 08/10/2021 18:50:11     [email or web address]  Job#: [id number]     Doc#: [id number]    CC:

## 2021-08-10 NOTE — ANESTHESIA POSTPROCEDURE EVALUATION
Department of Anesthesiology  Postprocedure Note    Patient: Osmar Newby  MRN: [id number]  YOB: 1990  Date of evaluation: 8/10/2021  Time:  6:16 AM     Procedure Summary     Date: 08/09/21 Room / Location:     Anesthesia Start: 1335 Anesthesia Stop: 2200    Procedure: Labor Analgesia Diagnosis:     Scheduled Providers:  Responsible Provider: Ravindra Rogers MD    Anesthesia Type: epidural ASA Status: 2          Anesthesia Type: epidural    Mary Phase I:      Mary Phase II: Mary Score: 10    Last vitals: Reviewed and per EMR flowsheets.        Anesthesia Post Evaluation    Patient location during evaluation: bedside  Patient participation: complete - patient participated  Level of consciousness: awake  Pain score: 2  Airway patency: patent  Nausea & Vomiting: no nausea and no vomiting  Complications: no  Cardiovascular status: hemodynamically stable  Respiratory status: acceptable  Hydration status: euvolemic

## 2021-08-10 NOTE — PROGRESS NOTES
Live Viable birth of  BOY via  at 200. Apgars 9/10  Weight: 8lb 0oz  Length: 21'' long    Skin to Skin initiated between mother & baby at 31 75 62. Baby alert, color pink, respirations regular. Patient & SO educated on safe skin to skin practice with proper positioning of baby & mother, Mother's HOB elevated and assurance of unobstructed airway. Verbalized understanding with no questions asked.

## 2021-08-10 NOTE — PROGRESS NOTES
Baby boy born at 200 . Apgars 9 /10 . Weight  8Lb 0 Oz. Spontaneous respirations & lusty cry. Reviewed with patient plan of care during recovery period. Instructed not to get out of bed without staff and the lingering effects of the epidural.  Verbalized understanding. No questions voiced when asked. Skin to Skin initiated between mother & baby. Baby alert, color pink, respirations regular. Patient & SO educated on safe skin to skin practice with proper positioning of baby & mother, maintain mother's HOB elevated and assurance of unobstructed airway. Verbalized understanding with no questions asked.

## 2021-08-10 NOTE — PLAN OF CARE
Lactation Consultant phone number given to patient for any post-discharge questions or concerns and Labor & Delivery phone number given if Lactation Consultant not available.   NATE PaulaN, RN, IBCLC, CCCE

## 2021-08-10 NOTE — PROGRESS NOTES
Assumed care of pt for 11-7 shift. First contact with pt. Plan of care for night discussed. Pt verbalizes understanding. Recovery period continues. Skin to skin continues.

## 2021-08-10 NOTE — PLAN OF CARE
Problem: Fluid Volume - Imbalance:  Goal: Absence of imbalanced fluid volume signs and symptoms  Description: Absence of imbalanced fluid volume signs and symptoms  8/10/2021 0819 by Shiv Wall RN  Outcome: Met This Shift     Problem: Fluid Volume - Imbalance:  Goal: Absence of postpartum hemorrhage signs and symptoms  Description: Absence of postpartum hemorrhage signs and symptoms  8/10/2021 0819 by Shiv Wall RN  Outcome: Met This Shift     Problem: Infection - Risk of, Puerperal Infection:  Goal: Will show no infection signs and symptoms  Description: Will show no infection signs and symptoms  8/10/2021 0819 by Shiv Wall RN  Outcome: Met This Shift     Problem: Mood - Altered:  Goal: Mood stable  Description: Mood stable  8/10/2021 0819 by Shiv Wall RN  Outcome: Met This Shift     Problem: Pain - Acute:  Goal: Pain level will decrease  Description: Pain level will decrease  8/10/2021 0819 by Shiv Wall RN  Outcome: Met This Shift     Problem: Breastfeeding - Ineffective:  Goal: Infant able to latch onto breast  Description: Infant able to latch onto breast  Outcome: Met This Shift     Problem: Breastfeeding - Ineffective:  Goal: Intact skin on mother's nipple  Description: Intact skin on mother's nipple  Outcome: Met This Shift     Problem: Breastfeeding - Ineffective:  Goal: Uninterrupted skin-to-skin contact during initial breast-feeding if medically possible  Description: Uninterrupted skin-to-skin contact during initial breast-feeding if medically possible  Outcome: Met This Shift     Problem: Breastfeeding - Ineffective:  Goal: Urine and stool output as expected for age  Description: Urine and stool output as expected for age  Outcome: Met This Shift     Problem: Breastfeeding - Ineffective:  Goal: Weight loss within specified parameters for age  Description: Weight loss within specified parameters for age  Outcome: Met This Shift     Problem: Discharge Planning:  Goal: Discharged to appropriate level of care  Description: Discharged to appropriate level of care  8/10/2021 0819 by Leeann Mobley RN  Outcome: Ongoing     Problem: Constipation:  Goal: Bowel elimination is within specified parameters  Description: Bowel elimination is within specified parameters  8/10/2021 0819 by Leeann Mobley RN  Outcome: Ongoing

## 2021-08-11 VITALS
OXYGEN SATURATION: 97 % | HEIGHT: 66 IN | TEMPERATURE: 97.7 F | BODY MASS INDEX: 29.73 KG/M2 | HEART RATE: 71 BPM | RESPIRATION RATE: 17 BRPM | DIASTOLIC BLOOD PRESSURE: 78 MMHG | SYSTOLIC BLOOD PRESSURE: 120 MMHG | WEIGHT: 185 LBS

## 2021-08-11 PROCEDURE — 90471 IMMUNIZATION ADMIN: CPT | Performed by: LEGAL MEDICINE

## 2021-08-11 PROCEDURE — 6360000002 HC RX W HCPCS: Performed by: LEGAL MEDICINE

## 2021-08-11 PROCEDURE — 90715 TDAP VACCINE 7 YRS/> IM: CPT | Performed by: LEGAL MEDICINE

## 2021-08-11 PROCEDURE — 6370000000 HC RX 637 (ALT 250 FOR IP): Performed by: LEGAL MEDICINE

## 2021-08-11 RX ADMIN — DOCUSATE SODIUM 100 MG: 100 CAPSULE, LIQUID FILLED ORAL at 08:17

## 2021-08-11 RX ADMIN — IBUPROFEN 400 MG: 400 TABLET, FILM COATED ORAL at 08:17

## 2021-08-11 RX ADMIN — FAMOTIDINE 20 MG: 20 TABLET ORAL at 08:17

## 2021-08-11 RX ADMIN — TETANUS TOXOID, REDUCED DIPHTHERIA TOXOID AND ACELLULAR PERTUSSIS VACCINE, ADSORBED 0.5 ML: 5; 2.5; 8; 8; 2.5 SUSPENSION INTRAMUSCULAR at 11:28

## 2021-08-11 RX ADMIN — IBUPROFEN 400 MG: 400 TABLET, FILM COATED ORAL at 11:28

## 2021-08-11 RX ADMIN — LEVOTHYROXINE SODIUM 50 MCG: 0.05 TABLET ORAL at 07:52

## 2021-08-11 ASSESSMENT — PAIN SCALES - GENERAL
PAINLEVEL_OUTOF10: 2
PAINLEVEL_OUTOF10: 2

## 2021-08-11 ASSESSMENT — PAIN DESCRIPTION - PROGRESSION: CLINICAL_PROGRESSION: NOT CHANGED

## 2021-08-11 NOTE — PROGRESS NOTES
[place] returned to mother. Circumcision care explained and all questions answered. MOB informed to call if assistance needed. Call light in reach. Verbalizes understanding.

## 2021-08-11 NOTE — PROGRESS NOTES
Assumed care of patient at [address] [place] [address] with bedside report. Patient asleep, family visiting, call light within reach. White board updated.

## 2021-08-11 NOTE — PROGRESS NOTES
Postpartum care and  care teaching given to maternal patient. AWHONN post-birth warning signs reviewed with patient. Patient viewed both safe sleep and shaken baby videos, and education given on both. TDaP vaccine given, education including CDC handout given. Patient verbalized understanding of all above education with no questions. Discharge instructions given to patient, who verbalized understanding with no questions; signed attestation page to soft chart.

## 2023-06-07 LAB — PAP SMEAR: NORMAL

## 2023-07-26 NOTE — H&P
[address]                              HISTORY AND PHYSICAL    PATIENT NAME: Nessa Garcia                   :        1990  MED REC NO:   [id number]                            ROOM:       LD02  ACCOUNT NO:   [de-identified]                           ADMIT DATE: 2021. PROVIDER:     Jomar Griffin MD      HISTORY OF PRESENT ILLNESS:  The patient is a 67-year-old white female  presented on 2021 with complaints of regular uterine contractions  for several hours. No leaking fluid or vaginal bleeding. No change in  fetal movements. No change in her bowel or urinary habits. No viral  prodrome. No vulvar lesions. Group B Strep is negative. For her past medical, surgical, GYN, social, family history, please  refer to ACOG forms A and B. REVIEW OF SYSTEMS:  Negative for cardiac, respiratory, GI,   abnormalities. No PIH symptoms. PHYSICAL EXAMINATION:  VITAL SIGNS:  Stable. Blood pressure 112/59, pulse 76, respiratory rate  18, temperature 98.9, O2 sat 99% on room air. PELVIC:  Fetal heart tones are present in the 140s with exvi-vs-ebzl  variability present. Accelerations noted. Contractions are every 2  minutes. Estimated fetal weight is 3500 gm. Cervix is complete vertex,  0 station with no palpable bag of fluid. EXTREMITIES:  No clubbing, cyanosis. 2+ edema. NEURO:  CN II through XII are grossly intact. She is alert and oriented  x4. ASSESSMENT:  Intrauterine pregnancy at term in labor. PLAN:  Risks of vaginal delivery and operative delivery have been  reviewed with her. Indications for operative delivery have been  reviewed with her. Shoulder dystocia and birth trauma have been  reviewed with her. All her questions have been answered and she wishes  to proceed with attempt at vaginal delivery.           Alison Dennison MD    D: 2021 17:07:20       T: 2021 17:09:30     [email or web address]  Job#: [id number]     Doc#: [id number]    CC: Klisyri Pregnancy And Lactation Text: It is unknown if this medication can harm a developing fetus or if it is excreted in breast milk.

## 2023-08-15 ENCOUNTER — ROUTINE PRENATAL (OUTPATIENT)
Age: 33
End: 2023-08-15

## 2023-08-15 VITALS
HEART RATE: 82 BPM | DIASTOLIC BLOOD PRESSURE: 67 MMHG | WEIGHT: 164.9 LBS | HEIGHT: 65 IN | BODY MASS INDEX: 27.47 KG/M2 | SYSTOLIC BLOOD PRESSURE: 101 MMHG

## 2023-08-15 DIAGNOSIS — Z3A.12 12 WEEKS GESTATION OF PREGNANCY: Primary | ICD-10-CM

## 2023-08-15 DIAGNOSIS — E03.9 HYPOTHYROIDISM, UNSPECIFIED TYPE: ICD-10-CM

## 2023-08-15 PROCEDURE — 99902 PR PRENATAL VISIT: CPT | Performed by: LEGAL MEDICINE

## 2023-08-15 RX ORDER — ONDANSETRON 4 MG/1
4 TABLET, ORALLY DISINTEGRATING ORAL 2 TIMES DAILY PRN
Qty: 14 TABLET | Refills: 5 | Status: SHIPPED | OUTPATIENT
Start: 2023-08-15 | End: 2023-08-17 | Stop reason: SDUPTHER

## 2023-08-15 NOTE — PATIENT INSTRUCTIONS
Patient Education        Learning About Birth Defects Testing  What is birth defects testing? Birth defects testing is done during pregnancy to look for possible problems with the baby (fetus). A birth defect may have only a minor impact on a child's life. Or it can have a major effect on quality or length of life. You and your doctor can choose from many tests. You may have no tests, one test, or many tests. What are the types of tests? You may have a screening test, a diagnostic test, or both. Screening tests show the chance that a baby has a certain birth defect, such as Down syndrome, spina bifida, or trisomy 25. Diagnostic tests show if a baby has a certain birth defect. Screening tests and when they're done:  Blood tests at 10 to 13 weeks (first trimester)  Cell-free fetal DNA test at 10 weeks or later (first trimester)  Nuchal translucency test at 10 to 13 weeks (first trimester)  Quad screening at 15 to 22 weeks (second trimester)  Ultrasound at 18 to 22 weeks (second trimester)  Diagnostic tests and when they're done:  Chorionic villus sampling ([facility]) at 10 to 13 weeks (first trimester)  Amniocentesis at 15 to 20 weeks (second trimester)  Sometimes doctors will look at the combined screenings that you've had over a period of time. This is called an integrated screening. What are the screening tests? Blood tests are done to look at different substances in your blood. These tests include cell free fetal DNA and quadruple (quad) blood tests. Both of these tests can help find genetic problems. Nuchal translucency test uses ultrasound to measure the thickness of the area at the back of the baby's neck. An increase in thickness can be an early sign of certain birth defects. Ultrasound is a tool that uses sound waves to make pictures of your baby and placenta inside the uterus. Ultrasound lets your doctor see an image of your baby.  It can help your doctor look for problems of the heart, spine, belly, or

## 2023-08-15 NOTE — PROGRESS NOTES
HYPOTHYROID  H/O TOB AND ETOH  PAP 23 6.4 WKS EDC 3/1/24      X 1    12.1W    The patient is a 23-year-old white female  2 para 1 EDC 3/1/2024 by ultrasound on 2023 at 6.4 weeks. She has a history of hypothyroidism which is well controlled. She had a spontaneous vaginal delivery previously in . She is complaining of some nausea. She had some leftover Zofran that she used in the past and that has helped her. She has requested a refill on the Zofran. She is also constipated. She is using stool softeners with assistance with that entity as well. She declines NIPT testing, cystic fibrosis testing, or  spinal muscle atrophy testing. She is agreeable to OB ultrasound for evaluation for any anatomic abnormalities. The dose on her levothyroxine is 50 mcg. Reviewed need for TSH on an every trimester basis. The last TSH was normal about a month ago. TSH at 16 weeks and 32 weeks will be ordered. That will be for  and January 3. She reviewed the genetic screening form. She denies any family history of autism. She denies any family history of genetic  disorders. Abdomen is soft and nondistended and nontender. Fetal heart tones are present in the 160s. Prenatal labs and level 2 ultrasound were ordered. She is to follow-up at the office in 4 weeks. She indicates understanding of all instructions. Information regarding pregnancy diet exercise and vaccinations was provided. Prescription for Zofran was provided. Risks of birth defects with medications was reviewed.

## 2023-08-16 DIAGNOSIS — E03.9 HYPOTHYROIDISM, UNSPECIFIED TYPE: ICD-10-CM

## 2023-08-16 DIAGNOSIS — Z3A.12 12 WEEKS GESTATION OF PREGNANCY: ICD-10-CM

## 2023-08-17 RX ORDER — ONDANSETRON 4 MG/1
4 TABLET, ORALLY DISINTEGRATING ORAL 2 TIMES DAILY PRN
Qty: 14 TABLET | Refills: 5 | Status: SHIPPED | OUTPATIENT
Start: 2023-08-17

## 2023-08-18 LAB
CULTURE: NORMAL
SPECIMEN DESCRIPTION: NORMAL

## 2023-08-29 ENCOUNTER — ANCILLARY PROCEDURE (OUTPATIENT)
Dept: OBGYN CLINIC | Age: 33
End: 2023-08-29
Payer: COMMERCIAL

## 2023-08-29 ENCOUNTER — INITIAL PRENATAL (OUTPATIENT)
Dept: OBGYN CLINIC | Age: 33
End: 2023-08-29
Payer: COMMERCIAL

## 2023-08-29 VITALS
DIASTOLIC BLOOD PRESSURE: 74 MMHG | BODY MASS INDEX: 27.67 KG/M2 | HEART RATE: 91 BPM | SYSTOLIC BLOOD PRESSURE: 110 MMHG | WEIGHT: 166.25 LBS

## 2023-08-29 DIAGNOSIS — E03.9 HYPOTHYROIDISM, UNSPECIFIED TYPE: ICD-10-CM

## 2023-08-29 DIAGNOSIS — Z3A.13 13 WEEKS GESTATION OF PREGNANCY: Primary | ICD-10-CM

## 2023-08-29 PROCEDURE — 99203 OFFICE O/P NEW LOW 30 MIN: CPT | Performed by: OBSTETRICS & GYNECOLOGY

## 2023-08-29 PROCEDURE — 4004F PT TOBACCO SCREEN RCVD TLK: CPT | Performed by: OBSTETRICS & GYNECOLOGY

## 2023-08-29 PROCEDURE — G8419 CALC BMI OUT NRM PARAM NOF/U: HCPCS | Performed by: OBSTETRICS & GYNECOLOGY

## 2023-08-29 PROCEDURE — 81002 URINALYSIS NONAUTO W/O SCOPE: CPT | Performed by: OBSTETRICS & GYNECOLOGY

## 2023-08-29 PROCEDURE — G8427 DOCREV CUR MEDS BY ELIG CLIN: HCPCS | Performed by: OBSTETRICS & GYNECOLOGY

## 2023-08-29 PROCEDURE — 76805 OB US >/= 14 WKS SNGL FETUS: CPT | Performed by: OBSTETRICS & GYNECOLOGY

## 2023-09-16 SDOH — ECONOMIC STABILITY: TRANSPORTATION INSECURITY
IN THE PAST 12 MONTHS, HAS LACK OF TRANSPORTATION KEPT YOU FROM MEETINGS, WORK, OR FROM GETTING THINGS NEEDED FOR DAILY LIVING?: NO

## 2023-09-16 SDOH — ECONOMIC STABILITY: FOOD INSECURITY: WITHIN THE PAST 12 MONTHS, YOU WORRIED THAT YOUR FOOD WOULD RUN OUT BEFORE YOU GOT MONEY TO BUY MORE.: NEVER TRUE

## 2023-09-16 SDOH — ECONOMIC STABILITY: FOOD INSECURITY: WITHIN THE PAST 12 MONTHS, THE FOOD YOU BOUGHT JUST DIDN'T LAST AND YOU DIDN'T HAVE MONEY TO GET MORE.: NEVER TRUE

## 2023-09-16 SDOH — ECONOMIC STABILITY: HOUSING INSECURITY
IN THE LAST 12 MONTHS, WAS THERE A TIME WHEN YOU DID NOT HAVE A STEADY PLACE TO SLEEP OR SLEPT IN A SHELTER (INCLUDING NOW)?: NO

## 2023-09-16 SDOH — ECONOMIC STABILITY: INCOME INSECURITY: HOW HARD IS IT FOR YOU TO PAY FOR THE VERY BASICS LIKE FOOD, HOUSING, MEDICAL CARE, AND HEATING?: NOT VERY HARD

## 2023-09-19 ENCOUNTER — ROUTINE PRENATAL (OUTPATIENT)
Age: 33
End: 2023-09-19
Payer: COMMERCIAL

## 2023-09-19 VITALS
WEIGHT: 170 LBS | SYSTOLIC BLOOD PRESSURE: 114 MMHG | DIASTOLIC BLOOD PRESSURE: 68 MMHG | HEART RATE: 83 BPM | BODY MASS INDEX: 28.29 KG/M2

## 2023-09-19 DIAGNOSIS — Z3A.16 16 WEEKS GESTATION OF PREGNANCY: Primary | ICD-10-CM

## 2023-09-19 LAB
APPEARANCE FLUID: CLEAR
BILIRUBIN, POC: NORMAL
BLOOD URINE, POC: NORMAL
CLARITY, POC: NORMAL
COLOR, POC: NORMAL
GLUCOSE URINE, POC: NORMAL
KETONES, POC: NORMAL
LEUKOCYTE EST, POC: NORMAL
NITRITE, POC: NEGATIVE
PH, POC: NORMAL
PROTEIN, POC: NEGATIVE
SPECIFIC GRAVITY, POC: NORMAL
UROBILINOGEN, POC: NORMAL

## 2023-09-19 PROCEDURE — 81002 URINALYSIS NONAUTO W/O SCOPE: CPT | Performed by: LEGAL MEDICINE

## 2023-09-19 PROCEDURE — 99902 PR PRENATAL VISIT: CPT | Performed by: LEGAL MEDICINE

## 2023-09-19 ASSESSMENT — PATIENT HEALTH QUESTIONNAIRE - PHQ9
SUM OF ALL RESPONSES TO PHQ QUESTIONS 1-9: 0
1. LITTLE INTEREST OR PLEASURE IN DOING THINGS: 0
SUM OF ALL RESPONSES TO PHQ QUESTIONS 1-9: 0
SUM OF ALL RESPONSES TO PHQ9 QUESTIONS 1 & 2: 0
SUM OF ALL RESPONSES TO PHQ QUESTIONS 1-9: 0
SUM OF ALL RESPONSES TO PHQ QUESTIONS 1-9: 0
2. FEELING DOWN, DEPRESSED OR HOPELESS: 0

## 2023-09-21 LAB
CULTURE: NORMAL
SPECIMEN DESCRIPTION: NORMAL

## 2023-09-22 ENCOUNTER — TELEPHONE (OUTPATIENT)
Age: 33
End: 2023-09-22

## 2023-09-22 NOTE — TELEPHONE ENCOUNTER
Patient called and wanted to know if you knew anything as to how much blood was in her urine. She said she was told there was blood in her urine and she was concerned as to why since she is pregnant.

## 2023-09-27 ENCOUNTER — TELEPHONE (OUTPATIENT)
Age: 33
End: 2023-09-27

## 2023-09-28 DIAGNOSIS — Z3A.17 17 WEEKS GESTATION OF PREGNANCY: Primary | ICD-10-CM

## 2023-09-28 RX ORDER — LEVOTHYROXINE SODIUM 0.05 MG/1
50 TABLET ORAL DAILY
Qty: 90 TABLET | Refills: 1 | Status: SHIPPED | OUTPATIENT
Start: 2023-09-28

## 2023-09-28 NOTE — PROGRESS NOTES
Synthroid (levothyroxine) 50 mcg p.o. daily #90 with 1 refill sent to [address] [place] [address] in [facility] per patient request.  She has a TSH ordered at 32 weeks.

## 2023-10-06 ENCOUNTER — TELEPHONE (OUTPATIENT)
Dept: ADMINISTRATIVE | Age: 33
End: 2023-10-06

## 2023-10-06 NOTE — TELEPHONE ENCOUNTER
Pt is 20 weeks pregnant, has lower abdominal pain, pt also has anxiety and wants to be seen today by provider. Please call pt back. Thanks!

## 2023-10-06 NOTE — TELEPHONE ENCOUNTER
Spoke with patient concerning abdominal pain. Patient stated that the pain is very low to little now. She was just concerned that it was 2 days in a row and that she has an anatomy scan on Monday, stated that if she has any more questions or concerns after that appointment then she will call and make an appointment with this Provider.

## 2023-10-09 ENCOUNTER — ANCILLARY PROCEDURE (OUTPATIENT)
Dept: OBGYN CLINIC | Age: 33
End: 2023-10-09
Payer: COMMERCIAL

## 2023-10-09 ENCOUNTER — ROUTINE PRENATAL (OUTPATIENT)
Dept: OBGYN CLINIC | Age: 33
End: 2023-10-09
Payer: COMMERCIAL

## 2023-10-09 ENCOUNTER — HOSPITAL ENCOUNTER (OUTPATIENT)
Age: 33
Discharge: HOME | End: 2023-10-09
Payer: COMMERCIAL

## 2023-10-09 VITALS
SYSTOLIC BLOOD PRESSURE: 108 MMHG | DIASTOLIC BLOOD PRESSURE: 64 MMHG | WEIGHT: 174.25 LBS | BODY MASS INDEX: 29 KG/M2 | HEART RATE: 94 BPM

## 2023-10-09 DIAGNOSIS — E03.8 OTHER SPECIFIED HYPOTHYROIDISM: ICD-10-CM

## 2023-10-09 DIAGNOSIS — E03.9 HYPOTHYROIDISM, UNSPECIFIED TYPE: ICD-10-CM

## 2023-10-09 DIAGNOSIS — Z34.90 SECOND PREGNANCY: ICD-10-CM

## 2023-10-09 DIAGNOSIS — Z3A.19 19 WEEKS GESTATION OF PREGNANCY: Primary | ICD-10-CM

## 2023-10-09 PROBLEM — Z37.9 NORMAL LABOR: Status: RESOLVED | Noted: 2021-08-10 | Resolved: 2023-10-09

## 2023-10-09 LAB — TSH SERPL DL<=0.05 MIU/L-ACNC: 1.4 UIU/ML (ref 0.27–4.2)

## 2023-10-09 PROCEDURE — 81002 URINALYSIS NONAUTO W/O SCOPE: CPT | Performed by: OBSTETRICS & GYNECOLOGY

## 2023-10-09 PROCEDURE — 76811 OB US DETAILED SNGL FETUS: CPT | Performed by: OBSTETRICS & GYNECOLOGY

## 2023-10-09 PROCEDURE — 99999 PR OFFICE/OUTPT VISIT,PROCEDURE ONLY: CPT | Performed by: OBSTETRICS & GYNECOLOGY

## 2023-10-09 PROCEDURE — 99213 OFFICE O/P EST LOW 20 MIN: CPT | Performed by: OBSTETRICS & GYNECOLOGY

## 2023-10-09 PROCEDURE — 36415 COLL VENOUS BLD VENIPUNCTURE: CPT

## 2023-10-09 PROCEDURE — 82105 ALPHA-FETOPROTEIN SERUM: CPT

## 2023-10-09 PROCEDURE — 84443 ASSAY THYROID STIM HORMONE: CPT

## 2023-10-09 NOTE — PROGRESS NOTES
[address] [facility] FETAL MEDICINE  [address] [address]  Dept: [phone]  Loc: [phone]     10/9/2023    RE:  Rachel Benavides     : 1990   AGE: 29 y.o. Dear Dr. Johan Gomez,    Thank you for allowing me to see Rachel Benavides. As I'm sure you will recall, Rachelflores Benavides is a 29 y.o. [id number]'s last menstrual period was 2023 (exact date). Estimated Date of Delivery: 3/1/24 at 19w3d seen in our office today for the following:    REASON FOR VISIT: Level II    Patient Active Problem List    Diagnosis Date Noted    Second pregnancy 10/09/2023    Hypothyroid 08/15/2023    19 weeks gestation of pregnancy 2021        PAST HISTORY:  OB History    Para Term  AB Living   2 1 1     1   SAB IAB Ectopic Molar Multiple Live Births           0 1      # Outcome Date GA Lbr Koko/2nd Weight Sex Delivery Anes PTL Lv   2 Current            1 Term 21 39w1d 11:15 / 02:15 8 lb (3.629 kg) M Vag-Spont EPI  MARLIN          MEDICAL:  Past Medical History:   Diagnosis Date    Hypothyroidism         SURGICAL:  Past Surgical History:   Procedure Laterality Date    TONSILLECTOMY N/A        ALLERGIES:    Allergies   Allergen Reactions    Alavert [Loratadine]     Pcn [Penicillins] Swelling         MEDICATIONS:    Current Outpatient Medications   Medication Sig Dispense Refill    levothyroxine (SYNTHROID) 50 MCG tablet Take 1 tablet by mouth daily 90 tablet 1    Prenatal MV-Min-Fe Fum-FA-DHA (PRENATAL 1 PO) Take by mouth      levothyroxine (SYNTHROID) 50 MCG tablet Take 1 tablet by mouth Daily      ondansetron (ZOFRAN-ODT) 4 MG disintegrating tablet Take 1 tablet by mouth 2 times daily as needed for Nausea or Vomiting Risk of birth defects reviewed. (Patient not taking: Reported on 10/9/2023) 14 tablet 5     No current facility-administered medications for this visit.         Social History     Socioeconomic History

## 2023-10-10 NOTE — RESULT ENCOUNTER NOTE
Please call patient 10/10/23. Report shows normal TSH. Let me know if she needs a refill on her thyroid medication.               Thank you

## 2023-10-11 ENCOUNTER — TELEPHONE (OUTPATIENT)
Age: 33
End: 2023-10-11

## 2023-10-11 NOTE — TELEPHONE ENCOUNTER
----- Message from Robb Garcia MD sent at 10/10/2023  9:00 AM EDT -----  Please call patient 10/10/23. Report shows normal TSH. Let me know if she needs a refill on her thyroid medication.               Thank you

## 2023-10-12 LAB
AFP INTERPRETATION: NORMAL
AFP MOM: 1.15
AFP SPECIMEN: NORMAL
AFP: 57 NG/ML
DATE OF BIRTH: NORMAL
DATING METHOD: NORMAL
DETERMINED BY: NORMAL
DIABETIC: NORMAL
DONOR EGG?: NORMAL
DUE DATE: NORMAL
ESTIMATED DUE DATE: NORMAL
FAMILY HISTORY NTD: NORMAL
GESTATIONAL AGE: NORMAL
IN VITRO FERTILIZATION: NORMAL
INSULIN REQ DIABETES: NO
LAST MENSTRUAL PERIOD: NORMAL
MATERNAL AGE AT EDD: 32.5 YR
MATERNAL WEIGHT: NORMAL
MONOCHORIONIC TWINS: NORMAL
NUMBER OF FETUSES: NORMAL
PATIENT WEIGHT UNITS: NORMAL
PATIENT WEIGHT: NORMAL
RACE (MATERNAL): NORMAL
RACE: NORMAL
REPEAT SPECIMEN?: NORMAL
SMOKING: NO
SMOKING: NORMAL
VALPROIC/CARBAMAZEP: NORMAL
ZZ NTE CLEAN UP: HISTORY: NO

## 2023-10-31 ENCOUNTER — ROUTINE PRENATAL (OUTPATIENT)
Age: 33
End: 2023-10-31
Payer: COMMERCIAL

## 2023-10-31 VITALS
SYSTOLIC BLOOD PRESSURE: 116 MMHG | WEIGHT: 171 LBS | BODY MASS INDEX: 28.46 KG/M2 | DIASTOLIC BLOOD PRESSURE: 75 MMHG | HEART RATE: 86 BPM

## 2023-10-31 DIAGNOSIS — Z3A.22 22 WEEKS GESTATION OF PREGNANCY: Primary | ICD-10-CM

## 2023-10-31 LAB
APPEARANCE FLUID: CLEAR
BILIRUBIN, POC: NORMAL
BLOOD URINE, POC: NORMAL
CLARITY, POC: NORMAL
COLOR, POC: NORMAL
GLUCOSE URINE, POC: NEGATIVE
KETONES, POC: NORMAL
LEUKOCYTE EST, POC: NEGATIVE
NITRITE, POC: NEGATIVE
PH, POC: NORMAL
PROTEIN, POC: NEGATIVE
SPECIFIC GRAVITY, POC: NORMAL
UROBILINOGEN, POC: NORMAL

## 2023-10-31 PROCEDURE — 81002 URINALYSIS NONAUTO W/O SCOPE: CPT | Performed by: LEGAL MEDICINE

## 2023-10-31 PROCEDURE — 99902 PR PRENATAL VISIT: CPT | Performed by: LEGAL MEDICINE

## 2023-11-13 ENCOUNTER — HOSPITAL ENCOUNTER (EMERGENCY)
Age: 33
Discharge: HOME | End: 2023-11-13
Attending: EMERGENCY MEDICINE
Payer: COMMERCIAL

## 2023-11-13 ENCOUNTER — HOSPITAL ENCOUNTER (OUTPATIENT)
Age: 33
Discharge: HOME | End: 2023-11-13
Payer: COMMERCIAL

## 2023-11-13 ENCOUNTER — APPOINTMENT (OUTPATIENT)
Dept: ULTRASOUND IMAGING | Age: 33
End: 2023-11-13
Payer: COMMERCIAL

## 2023-11-13 VITALS
RESPIRATION RATE: 16 BRPM | TEMPERATURE: 97.8 F | DIASTOLIC BLOOD PRESSURE: 58 MMHG | BODY MASS INDEX: 28.12 KG/M2 | OXYGEN SATURATION: 97 % | SYSTOLIC BLOOD PRESSURE: 115 MMHG | WEIGHT: 175 LBS | HEART RATE: 82 BPM | HEIGHT: 66 IN

## 2023-11-13 DIAGNOSIS — Z3A.22 22 WEEKS GESTATION OF PREGNANCY: ICD-10-CM

## 2023-11-13 DIAGNOSIS — Z3A.19 19 WEEKS GESTATION OF PREGNANCY: ICD-10-CM

## 2023-11-13 DIAGNOSIS — E03.9 HYPOTHYROIDISM, UNSPECIFIED TYPE: ICD-10-CM

## 2023-11-13 DIAGNOSIS — Z3A.12 12 WEEKS GESTATION OF PREGNANCY: ICD-10-CM

## 2023-11-13 DIAGNOSIS — Z3A.16 16 WEEKS GESTATION OF PREGNANCY: ICD-10-CM

## 2023-11-13 DIAGNOSIS — R55 SYNCOPE AND COLLAPSE: Primary | ICD-10-CM

## 2023-11-13 LAB
ABO + RH BLD: NORMAL
ALBUMIN SERPL-MCNC: 3.7 G/DL (ref 3.5–5.2)
ALP SERPL-CCNC: 74 U/L (ref 35–104)
ALT SERPL-CCNC: 6 U/L (ref 0–32)
ANION GAP SERPL CALCULATED.3IONS-SCNC: 8 MMOL/L (ref 7–16)
ARM BAND NUMBER: NORMAL
AST SERPL-CCNC: 10 U/L (ref 0–31)
BASOPHILS # BLD: 0.03 K/UL (ref 0–0.2)
BASOPHILS NFR BLD: 0 % (ref 0–2)
BILIRUB SERPL-MCNC: <0.2 MG/DL (ref 0–1.2)
BLOOD BANK SAMPLE EXPIRATION: NORMAL
BLOOD GROUP ANTIBODIES SERPL: NEGATIVE
BUN SERPL-MCNC: 7 MG/DL (ref 6–20)
CALCIUM SERPL-MCNC: 8.9 MG/DL (ref 8.6–10.2)
CHLORIDE SERPL-SCNC: 103 MMOL/L (ref 98–107)
CO2 SERPL-SCNC: 25 MMOL/L (ref 22–29)
CREAT SERPL-MCNC: 0.5 MG/DL (ref 0.5–1)
EOSINOPHIL # BLD: 0.1 K/UL (ref 0.05–0.5)
EOSINOPHILS RELATIVE PERCENT: 1 % (ref 0–6)
ERYTHROCYTE [DISTWIDTH] IN BLOOD BY AUTOMATED COUNT: 13.4 % (ref 11.5–15)
GFR SERPL CREATININE-BSD FRML MDRD: >60 ML/MIN/1.73M2
GLUCOSE SERPL-MCNC: 80 MG/DL (ref 74–99)
HCT VFR BLD AUTO: 34.2 % (ref 34–48)
HGB BLD-MCNC: 11.4 G/DL (ref 11.5–15.5)
IMM GRANULOCYTES # BLD AUTO: 0.08 K/UL (ref 0–0.58)
IMM GRANULOCYTES NFR BLD: 1 % (ref 0–5)
LYMPHOCYTES NFR BLD: 2.41 K/UL (ref 1.5–4)
LYMPHOCYTES RELATIVE PERCENT: 25 % (ref 20–42)
MCH RBC QN AUTO: 31.2 PG (ref 26–35)
MCHC RBC AUTO-ENTMCNC: 33.3 G/DL (ref 32–34.5)
MCV RBC AUTO: 93.7 FL (ref 80–99.9)
MONOCYTES NFR BLD: 0.64 K/UL (ref 0.1–0.95)
MONOCYTES NFR BLD: 7 % (ref 2–12)
NEUTROPHILS NFR BLD: 66 % (ref 43–80)
NEUTS SEG NFR BLD: 6.35 K/UL (ref 1.8–7.3)
PLATELET # BLD AUTO: 268 K/UL (ref 130–450)
PMV BLD AUTO: 10 FL (ref 7–12)
POTASSIUM SERPL-SCNC: 3.9 MMOL/L (ref 3.5–5)
PROT SERPL-MCNC: 6.4 G/DL (ref 6.4–8.3)
RBC # BLD AUTO: 3.65 M/UL (ref 3.5–5.5)
SODIUM SERPL-SCNC: 136 MMOL/L (ref 132–146)
TSH SERPL DL<=0.05 MIU/L-ACNC: 3.67 UIU/ML (ref 0.27–4.2)
WBC OTHER # BLD: 9.6 K/UL (ref 4.5–11.5)

## 2023-11-13 PROCEDURE — 86762 RUBELLA ANTIBODY: CPT

## 2023-11-13 PROCEDURE — 84702 CHORIONIC GONADOTROPIN TEST: CPT

## 2023-11-13 PROCEDURE — 80053 COMPREHEN METABOLIC PANEL: CPT

## 2023-11-13 PROCEDURE — 86850 RBC ANTIBODY SCREEN: CPT

## 2023-11-13 PROCEDURE — 86787 VARICELLA-ZOSTER ANTIBODY: CPT

## 2023-11-13 PROCEDURE — 86592 SYPHILIS TEST NON-TREP QUAL: CPT

## 2023-11-13 PROCEDURE — 82677 ASSAY OF ESTRIOL: CPT

## 2023-11-13 PROCEDURE — 82105 ALPHA-FETOPROTEIN SERUM: CPT

## 2023-11-13 PROCEDURE — 84443 ASSAY THYROID STIM HORMONE: CPT

## 2023-11-13 PROCEDURE — 99284 EMERGENCY DEPT VISIT MOD MDM: CPT

## 2023-11-13 PROCEDURE — 87389 HIV-1 AG W/HIV-1&-2 AB AG IA: CPT

## 2023-11-13 PROCEDURE — 93005 ELECTROCARDIOGRAM TRACING: CPT | Performed by: STUDENT IN AN ORGANIZED HEALTH CARE EDUCATION/TRAINING PROGRAM

## 2023-11-13 PROCEDURE — 86900 BLOOD TYPING SEROLOGIC ABO: CPT

## 2023-11-13 PROCEDURE — 86336 INHIBIN A: CPT

## 2023-11-13 PROCEDURE — 86803 HEPATITIS C AB TEST: CPT

## 2023-11-13 PROCEDURE — 76805 OB US >/= 14 WKS SNGL FETUS: CPT

## 2023-11-13 PROCEDURE — 2580000003 HC RX 258: Performed by: STUDENT IN AN ORGANIZED HEALTH CARE EDUCATION/TRAINING PROGRAM

## 2023-11-13 PROCEDURE — 86901 BLOOD TYPING SEROLOGIC RH(D): CPT

## 2023-11-13 PROCEDURE — 85025 COMPLETE CBC W/AUTO DIFF WBC: CPT

## 2023-11-13 PROCEDURE — 87340 HEPATITIS B SURFACE AG IA: CPT

## 2023-11-13 RX ORDER — 0.9 % SODIUM CHLORIDE 0.9 %
1000 INTRAVENOUS SOLUTION INTRAVENOUS ONCE
Status: COMPLETED | OUTPATIENT
Start: 2023-11-13 | End: 2023-11-13

## 2023-11-13 RX ORDER — SODIUM CHLORIDE 0.9 % (FLUSH) 0.9 %
SYRINGE (ML) INJECTION
Status: DISCONTINUED
Start: 2023-11-13 | End: 2023-11-13 | Stop reason: HOSPADM

## 2023-11-13 RX ADMIN — SODIUM CHLORIDE 1000 ML: 9 INJECTION, SOLUTION INTRAVENOUS at 11:16

## 2023-11-13 ASSESSMENT — LIFESTYLE VARIABLES
HOW MANY STANDARD DRINKS CONTAINING ALCOHOL DO YOU HAVE ON A TYPICAL DAY: PATIENT DOES NOT DRINK
HOW OFTEN DO YOU HAVE A DRINK CONTAINING ALCOHOL: NEVER

## 2023-11-13 NOTE — CODE DOCUMENTATION
Upon arrival to RRT patient sitting in lab chair, face pale. Patient given water at this time. Per  patient passed out during lab draw. Patient now speaking with physician.

## 2023-11-13 NOTE — CODE DOCUMENTATION
Patient agreeable to go to ED to be evaluated and get IV fluids. Patient family present at this time and given update.

## 2023-11-13 NOTE — ED PROVIDER NOTES
[address]      Pt Name: Levar Combs  MRN: [id number]  [address] [facility] [place] 1990  Date of evaluation: 11/13/2023  Provider: Nilesh Daniel DO  PCP: Kristy Sosa DO  Note Started: 10:55 AM EST 11/13/23    CHIEF COMPLAINT       Chief Complaint   Patient presents with    Loss of Consciousness     While in outpatient lab       HISTORY OF PRESENT ILLNESS: 1 or more Elements   History From: Patient  Limitations to history : None    Levar Combs is a 29 y.o. female who presents to the ED following a syncopal episode. Patient is currently 6 months pregnant and was scheduled to get her fasting glucose tolerance test this morning. While obtaining lab work in the outpatient setting she had a syncopal episode. A rapid response was called and patient was found to be pale and slightly somnolent. She reportedly was syncopal for only a few seconds prior to regaining consciousness. She denies any chest pain or shortness of breath associated with the incident. She denies any associated abdominal pain. She was offered further evaluation in the ED and accepted as she wanted to be sure that her baby was healthy. Blood glucose was tested following the syncopal episode and was found to be 102. She has no focal neurologic deficits on examination. Nursing Notes were all reviewed and agreed with or any disagreements were addressed in the HPI. REVIEW OF SYSTEMS :    Positives and Pertinent negatives as per HPI.      SURGICAL HISTORY     Past Surgical History:   Procedure Laterality Date    TONSILLECTOMY      TONSILLECTOMY N/A        CURRENTMEDICATIONS       Discharge Medication List as of 11/13/2023  2:48 PM        CONTINUE these medications which have NOT CHANGED    Details   levothyroxine (SYNTHROID) 50 MCG tablet Take 1 tablet by mouth daily, Disp-90 tablet, R-1Normal      Prenatal MV-Min-Fe Fum-FA-DHA (PRENATAL 1 PO) Take by

## 2023-11-14 LAB
HBV SURFACE AG SERPL QL IA: NONREACTIVE
HCV AB SERPL QL IA: NONREACTIVE
HIV 1+2 AB+HIV1 P24 AG SERPL QL IA: NONREACTIVE
RPR SER QL: NONREACTIVE
VZV IGG SER QL IA: NORMAL

## 2023-11-15 LAB
AFP INTERPRETATION: NORMAL
AFP MOM: 1.43
AFP QUAD INTERPRETATION: NORMAL
AFP SPECIMEN: NORMAL
AFP: 160 NG/ML
DATE OF BIRTH: NORMAL
DATING METHOD: NORMAL
DETERMINED BY: NORMAL
DIABETIC: NO
DIMERIC INHIBIN A: 299 PG/ML
DONOR EGG?: NO
DUE DATE: NORMAL
EKG ATRIAL RATE: 73 BPM
EKG P AXIS: 41 DEGREES
EKG P-R INTERVAL: 142 MS
EKG Q-T INTERVAL: 374 MS
EKG QRS DURATION: 86 MS
EKG QTC CALCULATION (BAZETT): 412 MS
EKG R AXIS: 40 DEGREES
EKG T AXIS: 11 DEGREES
EKG VENTRICULAR RATE: 73 BPM
ESTIMATED DUE DATE: NORMAL
FAMILY HISTORY NTD: NO
GESTATIONAL AGE: NORMAL
HX OF HEREDITARY DISORDERS: NO
IN VITRO FERTILIZATION: NO
INHIBIN A MOM: 1.11
INSULIN REQ DIABETES: NO
LAST MENSTRUAL PERIOD: NORMAL
MATERNAL AGE AT EDD: 32.5 YR
MATERNAL WEIGHT: 170
MOM FOR HCG, 2ND TRIMESTER: 1.2
MONOCHORIONIC TWINS: NO
NUMBER OF FETUSES: NORMAL
PATIENT WEIGHT UNITS: NORMAL
PATIENT WEIGHT: NORMAL
PATIENT'S HCG, TRI 2: NORMAL IU/L
PREV TRISOMY PREG: NO
RACE (MATERNAL): NORMAL
RACE: NORMAL
REPEAT SPECIMEN?: NO
RUBV IGG SERPL QL IA: NORMAL IU/ML
SMOKING: NO
SMOKING: NO
UE3 MOM: 0.45
UE3 VALUE: 1.65 NG/ML
VALPROIC/CARBAMAZEP: NO
ZZ NTE CLEAN UP: HISTORY: NO

## 2023-11-15 PROCEDURE — 93010 ELECTROCARDIOGRAM REPORT: CPT | Performed by: INTERNAL MEDICINE

## 2023-11-20 ENCOUNTER — TELEPHONE (OUTPATIENT)
Age: 33
End: 2023-11-20

## 2023-11-20 ENCOUNTER — HOSPITAL ENCOUNTER (OUTPATIENT)
Age: 33
Discharge: HOME | End: 2023-11-20
Payer: COMMERCIAL

## 2023-11-20 DIAGNOSIS — Z3A.25 25 WEEKS GESTATION OF PREGNANCY: Primary | ICD-10-CM

## 2023-11-20 DIAGNOSIS — Z3A.25 25 WEEKS GESTATION OF PREGNANCY: ICD-10-CM

## 2023-11-20 LAB
AMOUNT GLUCOSE GIVEN: 50 G
COLLECT TIME, 1HR GLUCOSE: 1414
GLUCOSE 3H P 100 G GLC PO SERPL-MCNC: 179 MG/DL

## 2023-11-20 PROCEDURE — 36415 COLL VENOUS BLD VENIPUNCTURE: CPT

## 2023-11-20 PROCEDURE — 82947 ASSAY GLUCOSE BLOOD QUANT: CPT

## 2023-11-20 NOTE — RESULT ENCOUNTER NOTE
Please call patient. 11/20/2023    Report shows elevated glucose tolerance test.  Will need 3-hour glucose tolerance test.  Order was placed in epic.             Thank you

## 2023-11-20 NOTE — RESULT ENCOUNTER NOTE
Please call patient. 11/20/2023    Report shows elevated 1 hour glucose tolerance test.  Will need a 3-hour glucose tolerance test.  Refer this was placed.               Thank you

## 2023-11-21 ENCOUNTER — TELEPHONE (OUTPATIENT)
Age: 33
End: 2023-11-21

## 2023-11-21 NOTE — TELEPHONE ENCOUNTER
----- Message from Maira Lujan MD sent at 11/20/2023  4:25 PM EST -----  Please call patient. 11/20/2023    Report shows elevated 1 hour glucose tolerance test.  Will need a 3-hour glucose tolerance test.  Refer this was placed.               Thank you

## 2023-11-27 ENCOUNTER — TELEPHONE (OUTPATIENT)
Age: 33
End: 2023-11-27

## 2023-11-27 DIAGNOSIS — Z3A.26 26 WEEKS GESTATION OF PREGNANCY: Primary | ICD-10-CM

## 2023-11-27 NOTE — TELEPHONE ENCOUNTER
When she went to take her one hour glucola, the ER dr had told her to eat as she had a syncopal episode.  She wants to retake the 1-hr as she was not fasting, before she attempts the 3 hour GTT

## 2023-12-01 ENCOUNTER — HOSPITAL ENCOUNTER (OUTPATIENT)
Age: 33
Discharge: HOME | End: 2023-12-01
Payer: COMMERCIAL

## 2023-12-01 DIAGNOSIS — Z3A.26 26 WEEKS GESTATION OF PREGNANCY: ICD-10-CM

## 2023-12-01 LAB
AMOUNT GLUCOSE GIVEN: 50 G
COLLECT TIME, 1HR GLUCOSE: NORMAL
GLUCOSE 3H P 100 G GLC PO SERPL-MCNC: 102 MG/DL

## 2023-12-01 PROCEDURE — 36415 COLL VENOUS BLD VENIPUNCTURE: CPT

## 2023-12-01 PROCEDURE — 82947 ASSAY GLUCOSE BLOOD QUANT: CPT

## 2023-12-01 NOTE — RESULT ENCOUNTER NOTE
Please call patient.   12/1/2023    Report shows normal repeat glucose tolerance test      Thank you

## 2023-12-04 ENCOUNTER — ROUTINE PRENATAL (OUTPATIENT)
Age: 33
End: 2023-12-04
Payer: COMMERCIAL

## 2023-12-04 VITALS
WEIGHT: 178.2 LBS | DIASTOLIC BLOOD PRESSURE: 72 MMHG | HEIGHT: 65 IN | BODY MASS INDEX: 29.69 KG/M2 | SYSTOLIC BLOOD PRESSURE: 112 MMHG | HEART RATE: 95 BPM

## 2023-12-04 DIAGNOSIS — Z3A.27 27 WEEKS GESTATION OF PREGNANCY: Primary | ICD-10-CM

## 2023-12-04 LAB
BILIRUBIN, POC: NORMAL
BLOOD URINE, POC: NEGATIVE
CLARITY, POC: CLEAR
COLOR, POC: NORMAL
GLUCOSE URINE, POC: NEGATIVE
KETONES, POC: NORMAL
LEUKOCYTE EST, POC: 1
NITRITE, POC: NEGATIVE
PH, POC: NORMAL
PROTEIN, POC: NORMAL
SPECIFIC GRAVITY, POC: NORMAL
UROBILINOGEN, POC: NORMAL

## 2023-12-04 PROCEDURE — 99902 PR PRENATAL VISIT: CPT | Performed by: LEGAL MEDICINE

## 2023-12-04 PROCEDURE — 81002 URINALYSIS NONAUTO W/O SCOPE: CPT | Performed by: LEGAL MEDICINE

## 2023-12-04 NOTE — PATIENT INSTRUCTIONS
Your Tdap vaccine is due between 27 weeks to 31 weeks. RSV vaccine is due at 36 weeks. Flu vaccine and COVID-vaccine can be done at any time. You may have the vaccines given here in the office, or at your pharmacy.

## 2023-12-06 ENCOUNTER — TELEPHONE (OUTPATIENT)
Age: 33
End: 2023-12-06

## 2023-12-06 NOTE — TELEPHONE ENCOUNTER
----- Message from Rachel Lorenzo MD sent at 12/1/2023 12:14 PM EST -----  Please call patient.   12/1/2023    Report shows normal repeat glucose tolerance test      Thank you

## 2023-12-27 NOTE — PROGRESS NOTES
Glenny Ware    Patient presents for routine prenatal visit today at 31.5 weeks.  Has had a 4 pound weight gain since her last visit.  Total weight gain is 18 pounds.  Would like the Tdap vaccine.    Denies complaints  Denies VB, or cramping  Feeling movement at this time. Reviewed above.    /77   Pulse 78   Wt 82.6 kg (182 lb)   LMP 05/17/2023 (Exact Date)   BMI 30.29 kg/m²      Fundal height is 36 cm.  Fetal heart tones are present 140s.  Counseled on importance of TDAP for PT and S.O and other caregivers of infant.  Tdap injection given IM left arm.  All questions and concerns addressed at this time      ICD-10-CM    1. 31 weeks gestation of pregnancy  Z3A.31 Hemoglobin and Hematocrit     TSH           Return in about 2 weeks (around 1/17/2024) for FOLLOW UP.    Denisse Valdivia MD

## 2024-01-03 ENCOUNTER — ROUTINE PRENATAL (OUTPATIENT)
Age: 34
End: 2024-01-03
Payer: COMMERCIAL

## 2024-01-03 ENCOUNTER — ROUTINE PRENATAL (OUTPATIENT)
Dept: OBGYN CLINIC | Age: 34
End: 2024-01-03
Payer: COMMERCIAL

## 2024-01-03 ENCOUNTER — ANCILLARY PROCEDURE (OUTPATIENT)
Dept: OBGYN CLINIC | Age: 34
End: 2024-01-03
Payer: COMMERCIAL

## 2024-01-03 VITALS
DIASTOLIC BLOOD PRESSURE: 77 MMHG | WEIGHT: 182 LBS | HEART RATE: 78 BPM | SYSTOLIC BLOOD PRESSURE: 110 MMHG | BODY MASS INDEX: 30.29 KG/M2

## 2024-01-03 VITALS
BODY MASS INDEX: 30.29 KG/M2 | SYSTOLIC BLOOD PRESSURE: 90 MMHG | HEART RATE: 88 BPM | DIASTOLIC BLOOD PRESSURE: 59 MMHG | WEIGHT: 182 LBS

## 2024-01-03 DIAGNOSIS — Z3A.31 31 WEEKS GESTATION OF PREGNANCY: Primary | ICD-10-CM

## 2024-01-03 DIAGNOSIS — E03.9 HYPOTHYROIDISM, UNSPECIFIED TYPE: ICD-10-CM

## 2024-01-03 DIAGNOSIS — O26.13 LOW WEIGHT GAIN DURING PREGNANCY IN THIRD TRIMESTER: ICD-10-CM

## 2024-01-03 LAB
GLUCOSE URINE, POC: NORMAL
PROTEIN UA: NEGATIVE

## 2024-01-03 PROCEDURE — 81002 URINALYSIS NONAUTO W/O SCOPE: CPT | Performed by: OBSTETRICS & GYNECOLOGY

## 2024-01-03 PROCEDURE — 90715 TDAP VACCINE 7 YRS/> IM: CPT | Performed by: LEGAL MEDICINE

## 2024-01-03 PROCEDURE — 90471 IMMUNIZATION ADMIN: CPT | Performed by: LEGAL MEDICINE

## 2024-01-03 PROCEDURE — 99213 OFFICE O/P EST LOW 20 MIN: CPT | Performed by: OBSTETRICS & GYNECOLOGY

## 2024-01-03 PROCEDURE — 99999 PR OFFICE/OUTPT VISIT,PROCEDURE ONLY: CPT | Performed by: OBSTETRICS & GYNECOLOGY

## 2024-01-03 PROCEDURE — 99902 PR PRENATAL VISIT: CPT | Performed by: LEGAL MEDICINE

## 2024-01-03 PROCEDURE — 76816 OB US FOLLOW-UP PER FETUS: CPT | Performed by: OBSTETRICS & GYNECOLOGY

## 2024-01-03 NOTE — PROGRESS NOTES
Patient is here today for 4 wk f/u. Denies any vaginal bleeding, cramping, or leakage of fluids.  Patient reports good fetal movement.

## 2024-01-03 NOTE — PROGRESS NOTES
[facility]  [address] [place], [place] [address] [address]  Dept: [phone]  Loc: [phone]     1/3/2024    RE:  Glenny Ware     : 1990   AGE: 33 y.o.     Dear Dr. Valdivia,    Thank you for allowing me to see Glenny Ware.  As I'm sure you will recall, Glenny Ware is a 33 y.o. [id number]'s last menstrual period was 2023 (exact date). Estimated Date of Delivery: 3/1/24 at 31w5d seen in our office today for the following:    REASON FOR VISIT: Growth     Patient Active Problem List    Diagnosis Date Noted    Second pregnancy 10/09/2023    Hypothyroid 08/15/2023    31 weeks gestation of pregnancy 2021        PAST HISTORY:  OB History    Para Term  AB Living   2 1 1 0 0 1   SAB IAB Ectopic Molar Multiple Live Births   0 0 0 0   1      # Outcome Date GA Lbr Koko/2nd Weight Sex Delivery Anes PTL Lv   2 Current            1 Term 21 39w1d 11:15 / 02:15 3.629 kg (8 lb) M Vag-Spont EPI  [place]          MEDICAL:  Past Medical History:   Diagnosis Date    Hypothyroidism         SURGICAL:  Past Surgical History:   Procedure Laterality Date    TONSILLECTOMY      TONSILLECTOMY N/A        ALLERGIES:    Allergies   Allergen Reactions    Alavert [Loratadine]     Pcn [Penicillins]     Pcn [Penicillins] Swelling         MEDICATIONS:    Current Outpatient Medications   Medication Sig Dispense Refill    levothyroxine (SYNTHROID) 50 MCG tablet Take 1 tablet by mouth daily 90 tablet 1    Prenatal MV-Min-Fe Fum-FA-DHA (PRENATAL 1 PO) Take by mouth      ondansetron (ZOFRAN-ODT) 4 MG disintegrating tablet Take 1 tablet by mouth 2 times daily as needed for Nausea or Vomiting Risk of birth defects reviewed. (Patient not taking: Reported on 10/9/2023) 14 tablet 5     No current facility-administered medications for this visit.        Social History     Socioeconomic History    Marital status:    Tobacco Use

## 2024-01-10 ENCOUNTER — HOSPITAL ENCOUNTER (OUTPATIENT)
Age: 34
Discharge: HOME | End: 2024-01-10
Attending: LEGAL MEDICINE | Admitting: LEGAL MEDICINE
Payer: COMMERCIAL

## 2024-01-10 VITALS
HEART RATE: 90 BPM | DIASTOLIC BLOOD PRESSURE: 67 MMHG | OXYGEN SATURATION: 99 % | BODY MASS INDEX: 30.32 KG/M2 | HEIGHT: 65 IN | RESPIRATION RATE: 18 BRPM | WEIGHT: 182 LBS | SYSTOLIC BLOOD PRESSURE: 114 MMHG | TEMPERATURE: 98 F

## 2024-01-10 PROBLEM — Z3A.32 32 WEEKS GESTATION OF PREGNANCY: Status: ACTIVE | Noted: 2024-01-10

## 2024-01-10 LAB
ALBUMIN SERPL-MCNC: 3.4 G/DL (ref 3.5–5.2)
ALP SERPL-CCNC: 96 U/L (ref 35–104)
ALT SERPL-CCNC: 9 U/L (ref 0–32)
ANION GAP SERPL CALCULATED.3IONS-SCNC: 10 MMOL/L (ref 7–16)
AST SERPL-CCNC: 14 U/L (ref 0–31)
BACTERIA URNS QL MICRO: ABNORMAL
BASOPHILS # BLD: 0.03 K/UL (ref 0–0.2)
BASOPHILS NFR BLD: 0 % (ref 0–2)
BILIRUB SERPL-MCNC: 0.3 MG/DL (ref 0–1.2)
BILIRUB UR QL STRIP: NEGATIVE
BUN SERPL-MCNC: 5 MG/DL (ref 6–20)
CALCIUM SERPL-MCNC: 8.7 MG/DL (ref 8.6–10.2)
CHLORIDE SERPL-SCNC: 105 MMOL/L (ref 98–107)
CLARITY UR: ABNORMAL
CO2 SERPL-SCNC: 21 MMOL/L (ref 22–29)
COLOR UR: YELLOW
CREAT SERPL-MCNC: 0.6 MG/DL (ref 0.5–1)
EKG ATRIAL RATE: 88 BPM
EKG P AXIS: 55 DEGREES
EKG P-R INTERVAL: 132 MS
EKG Q-T INTERVAL: 354 MS
EKG QRS DURATION: 84 MS
EKG QTC CALCULATION (BAZETT): 428 MS
EKG R AXIS: 25 DEGREES
EKG T AXIS: 26 DEGREES
EKG VENTRICULAR RATE: 88 BPM
EOSINOPHIL # BLD: 0.06 K/UL (ref 0.05–0.5)
EOSINOPHILS RELATIVE PERCENT: 1 % (ref 0–6)
EPI CELLS #/AREA URNS HPF: ABNORMAL /HPF
ERYTHROCYTE [DISTWIDTH] IN BLOOD BY AUTOMATED COUNT: 13.4 % (ref 11.5–15)
GFR SERPL CREATININE-BSD FRML MDRD: >60 ML/MIN/1.73M2
GLUCOSE SERPL-MCNC: 78 MG/DL (ref 74–99)
GLUCOSE UR STRIP-MCNC: NEGATIVE MG/DL
HCT VFR BLD AUTO: 32.8 % (ref 34–48)
HGB BLD-MCNC: 11 G/DL (ref 11.5–15.5)
HGB UR QL STRIP.AUTO: NEGATIVE
IMM GRANULOCYTES # BLD AUTO: 0.11 K/UL (ref 0–0.58)
IMM GRANULOCYTES NFR BLD: 1 % (ref 0–5)
KETONES UR STRIP-MCNC: NEGATIVE MG/DL
LEUKOCYTE ESTERASE UR QL STRIP: ABNORMAL
LYMPHOCYTES NFR BLD: 1.84 K/UL (ref 1.5–4)
LYMPHOCYTES RELATIVE PERCENT: 18 % (ref 20–42)
MCH RBC QN AUTO: 31.3 PG (ref 26–35)
MCHC RBC AUTO-ENTMCNC: 33.5 G/DL (ref 32–34.5)
MCV RBC AUTO: 93.2 FL (ref 80–99.9)
MONOCYTES NFR BLD: 0.74 K/UL (ref 0.1–0.95)
MONOCYTES NFR BLD: 7 % (ref 2–12)
NEUTROPHILS NFR BLD: 72 % (ref 43–80)
NEUTS SEG NFR BLD: 7.27 K/UL (ref 1.8–7.3)
NITRITE UR QL STRIP: POSITIVE
PH UR STRIP: 7 [PH] (ref 5–9)
PLATELET # BLD AUTO: 218 K/UL (ref 130–450)
PMV BLD AUTO: 10.3 FL (ref 7–12)
POTASSIUM SERPL-SCNC: 4.2 MMOL/L (ref 3.5–5)
PROT SERPL-MCNC: 5.9 G/DL (ref 6.4–8.3)
PROT UR STRIP-MCNC: 30 MG/DL
RBC # BLD AUTO: 3.52 M/UL (ref 3.5–5.5)
RBC #/AREA URNS HPF: ABNORMAL /HPF
SODIUM SERPL-SCNC: 136 MMOL/L (ref 132–146)
SP GR UR STRIP: 1.01 (ref 1–1.03)
UROBILINOGEN UR STRIP-ACNC: 0.2 EU/DL (ref 0–1)
WBC #/AREA URNS HPF: ABNORMAL /HPF
WBC OTHER # BLD: 10.1 K/UL (ref 4.5–11.5)

## 2024-01-10 PROCEDURE — 85025 COMPLETE CBC W/AUTO DIFF WBC: CPT

## 2024-01-10 PROCEDURE — 99202 OFFICE O/P NEW SF 15 MIN: CPT

## 2024-01-10 PROCEDURE — 6370000000 HC RX 637 (ALT 250 FOR IP): Performed by: LEGAL MEDICINE

## 2024-01-10 PROCEDURE — 81001 URINALYSIS AUTO W/SCOPE: CPT

## 2024-01-10 PROCEDURE — 80053 COMPREHEN METABOLIC PANEL: CPT

## 2024-01-10 PROCEDURE — 87086 URINE CULTURE/COLONY COUNT: CPT

## 2024-01-10 PROCEDURE — 93005 ELECTROCARDIOGRAM TRACING: CPT | Performed by: LEGAL MEDICINE

## 2024-01-10 PROCEDURE — 93010 ELECTROCARDIOGRAM REPORT: CPT | Performed by: INTERNAL MEDICINE

## 2024-01-10 RX ORDER — NITROFURANTOIN 25; 75 MG/1; MG/1
100 CAPSULE ORAL ONCE
Status: COMPLETED | OUTPATIENT
Start: 2024-01-10 | End: 2024-01-10

## 2024-01-10 RX ADMIN — NITROFURANTOIN MONOHYDRATE/MACROCRYSTALLINE 100 MG: 25; 75 CAPSULE ORAL at 16:53

## 2024-01-10 NOTE — PROGRESS NOTES
Dr. Valdivia updated on patient status and EFM. Dr. Valdivia reviewed results remotely. Orders to send urine culture and give one time dose of 100 mg of Macrobid po and discharge patient after.

## 2024-01-10 NOTE — H&P
Department of Obstetrics and Gynecology  Attending Obstetrics History and Physical        CHIEF COMPLAINT: Presyncope    HISTORY OF PRESENT ILLNESS:      33-year-old white female  2 para 1 at 32 weeks and 5 days gestation presents on 1/10/2024 with complaints of 2 episodes of presyncope.  Lasted approximately 10 minutes.  She was sitting and having her nails done during the last episode today.  People around her noted that she was very pale, her lips were blue color.  She had eaten a granola bar and some yogurt before this happened.  Did not lose consciousness.  No  seizure activity.  No chest pain or shortness of breath.  No headaches.  Had some blurred vision during the episode.  No diaphoresis.  Denies orthopnea or dyspnea on exertion.  No palpitations.  Has a history of panic attacks, and thought she was having a panic attack, although she was not anxious.    The episodes resolved spontaneously once she sat down on the floor and put her head between her knees.    No nausea or vomiting.  No change in her bowel or urinary function.      No leaking fluid or vaginal bleeding.  No change in fetal movements.  No contractions.        Past Medical History:        Diagnosis Date    Hypothyroidism      Past Surgical History:        Procedure Laterality Date    TONSILLECTOMY      TONSILLECTOMY N/A      Social History:    TOBACCO:   reports that she quit smoking about 3 years ago. Her smoking use included cigarettes. She started smoking about 13 years ago. She has a 2.5 pack-year smoking history. She has never used smokeless tobacco.  ETOH:   reports that she does not currently use alcohol.  DRUGS:   reports no history of drug use.  Family History:   No family history on file.  Medications Prior to Admission:  Medications Prior to Admission: levothyroxine (SYNTHROID) 50 MCG tablet, Take 1 tablet by mouth daily  Prenatal MV-Min-Fe Fum-FA-DHA (PRENATAL 1 PO), Take by mouth  ondansetron (ZOFRAN-ODT) 4 MG

## 2024-01-10 NOTE — PROGRESS NOTES
Patient is here because she was at the nail salon and felt like she was going to pass out.Patient states she did not pass out,lips turned blue,had blurred vision,felt hot.Patient ate a granola bar and yogurt at 12 noon.Denies pain ,leaking or bleeding.EFM applied.DR Valdivia called for all info-orders received.

## 2024-01-11 ENCOUNTER — TELEPHONE (OUTPATIENT)
Age: 34
End: 2024-01-11

## 2024-01-11 RX ORDER — NITROFURANTOIN 25; 75 MG/1; MG/1
100 CAPSULE ORAL 2 TIMES DAILY
Qty: 14 CAPSULE | Refills: 0 | Status: SHIPPED | OUTPATIENT
Start: 2024-01-11 | End: 2024-01-18

## 2024-01-11 NOTE — H&P
Admitting diagnosis: Intrauterine pregnancy at 32-1/2 weeks with presyncope    Discharge diagnosis: Vasovagal episode, bacteriuria    Hospital course in detail: As stated in H&P, CBC and CMP were unremarkable.  EKG was normal.  O2 sat was 98% on room air.  UA was consistent with a UTI.  Urine culture was sent.  Macrobid was initiated.  She remained asymptomatic during her stay.  She had no further complaints and was therefore discharged home with fetal movement precautions, instructions for frequent feedings and adequate fluid intake, and instructions regarding maneuvers if the vasovagal episode recurs.

## 2024-01-12 LAB
MICROORGANISM SPEC CULT: ABNORMAL
MICROORGANISM SPEC CULT: ABNORMAL
SPECIMEN DESCRIPTION: ABNORMAL

## 2024-01-17 ENCOUNTER — ROUTINE PRENATAL (OUTPATIENT)
Age: 34
End: 2024-01-17
Payer: COMMERCIAL

## 2024-01-17 VITALS
DIASTOLIC BLOOD PRESSURE: 81 MMHG | WEIGHT: 180.2 LBS | BODY MASS INDEX: 29.99 KG/M2 | SYSTOLIC BLOOD PRESSURE: 118 MMHG | HEART RATE: 71 BPM

## 2024-01-17 DIAGNOSIS — Z3A.33 33 WEEKS GESTATION OF PREGNANCY: Primary | ICD-10-CM

## 2024-01-17 LAB
BILIRUBIN, POC: NORMAL
BLOOD URINE, POC: NEGATIVE
CLARITY, POC: CLEAR
COLOR, POC: NORMAL
GLUCOSE URINE, POC: NEGATIVE
KETONES, POC: NORMAL
LEUKOCYTE EST, POC: NORMAL
NITRITE, POC: NEGATIVE
PH, POC: NORMAL
PROTEIN, POC: NORMAL
SPECIFIC GRAVITY, POC: NORMAL
UROBILINOGEN, POC: NORMAL

## 2024-01-17 PROCEDURE — 0502F SUBSEQUENT PRENATAL CARE: CPT | Performed by: LEGAL MEDICINE

## 2024-01-17 PROCEDURE — 81002 URINALYSIS NONAUTO W/O SCOPE: CPT | Performed by: LEGAL MEDICINE

## 2024-01-20 NOTE — PROGRESS NOTES
Glenny Ware    Patient presents for routine prenatal visit today at 35 weeks.  Has had a 5 pound weight gain since her last visit.  Total weight gain with the pregnancy is 21 pounds.    Denies complaints  Denies VB, or cramping  Feeling movement at this time. Reviewed above.    BP (!) 112/59   Pulse 94   Wt 83.9 kg (185 lb)   LMP 05/17/2023 (Exact Date)   SpO2 98%   BMI 30.79 kg/m²   Albumin: Negative  Glucose: Negative  Fundal height is 40 cm.  Fetal heart tones are present in 140s.  Fetus is in cephalic presentation.  Cervix is closed and long.  Counseled on importance of TDAP for PT and S.O and other caregivers of infant.    All questions and concerns addressed at this time      ICD-10-CM    1. 35 weeks gestation of pregnancy  Z3A.35 POCT Urinalysis no Micro     Culture, GBS with Penicillin Allergy     C.trachomatis N.gonorrhoeae DNA, Urine     C.trachomatis N.gonorrhoeae DNA, Urine     Culture, GBS with Penicillin Allergy           Return in about 1 week (around 2/2/2024) for FOLLOW UP.    Denisse Valdivia MD

## 2024-01-26 ENCOUNTER — ROUTINE PRENATAL (OUTPATIENT)
Age: 34
End: 2024-01-26

## 2024-01-26 VITALS
DIASTOLIC BLOOD PRESSURE: 59 MMHG | OXYGEN SATURATION: 98 % | SYSTOLIC BLOOD PRESSURE: 112 MMHG | WEIGHT: 185 LBS | BODY MASS INDEX: 30.79 KG/M2 | HEART RATE: 94 BPM

## 2024-01-26 DIAGNOSIS — Z3A.35 35 WEEKS GESTATION OF PREGNANCY: Primary | ICD-10-CM

## 2024-01-26 LAB
BILIRUBIN, POC: NORMAL
BLOOD URINE, POC: NEGATIVE
CLARITY, POC: CLEAR
COLOR, POC: NORMAL
GLUCOSE URINE, POC: NEGATIVE
KETONES, POC: NORMAL
LEUKOCYTE EST, POC: NORMAL
NITRITE, POC: NEGATIVE
PH, POC: NORMAL
PROTEIN, POC: NEGATIVE
SPECIFIC GRAVITY, POC: NORMAL
UROBILINOGEN, POC: NORMAL

## 2024-01-26 ASSESSMENT — PATIENT HEALTH QUESTIONNAIRE - PHQ9
1. LITTLE INTEREST OR PLEASURE IN DOING THINGS: 0
2. FEELING DOWN, DEPRESSED OR HOPELESS: 0
SUM OF ALL RESPONSES TO PHQ QUESTIONS 1-9: 0
SUM OF ALL RESPONSES TO PHQ QUESTIONS 1-9: 0
SUM OF ALL RESPONSES TO PHQ9 QUESTIONS 1 & 2: 0
SUM OF ALL RESPONSES TO PHQ QUESTIONS 1-9: 0
SUM OF ALL RESPONSES TO PHQ QUESTIONS 1-9: 0

## 2024-01-26 NOTE — PATIENT INSTRUCTIONS
Patient Education         Big Baby: What If My Baby's Shoulder Gets Stuck? (01:29)  Your health professional recommends that you watch this short online health video.  Learn how care providers help release a baby's shoulder if it gets stuck in the pelvis during delivery.  Purpose:  Teaches what happens during a shoulder dystocia event during delivery.  Goal:  Users will understand how care providers release a baby's shoulder if it gets stuck in the pelvis.     How to watch the video    Scan the QR code   OR Visit the website    https://link.[email or web address]/r/Gmvwbgmifzszn   Current as of: July 11, 2023               Content Version: 13.9  © 2006-2023 [employer].   Care instructions adapted under license by [employer]. If you have questions about a medical condition or this instruction, always ask your healthcare professional. [employer] disclaims any warranty or liability for your use of this information.       Patient Education        Vacuum-Assisted Delivery: Care Instructions  Overview     Sometimes a baby needs a little help when coming through the birth canal during delivery. When this happens, the doctor may use a device called a vacuum extractor to help deliver the baby. This is called a vacuum-assisted delivery.  The cup-shaped extractor applies a gentle suction to the top of the baby's head and holds the baby in place. The vacuum extractor prevents the baby's head from moving back up the birth canal between contractions. And it can be used to help you push during contractions.  Your body will slowly heal in the next few weeks.  Follow-up care is a key part of your treatment and safety. Be sure to make and go to all appointments, and call your doctor if you are having problems. It's also a good idea to know your test results and keep a list of the medicines you take.  How can you care for yourself at home?  Vaginal bleeding and cramps    After delivery, you will have a bloody

## 2024-01-30 ENCOUNTER — TELEPHONE (OUTPATIENT)
Age: 34
End: 2024-01-30

## 2024-01-30 ENCOUNTER — ROUTINE PRENATAL (OUTPATIENT)
Age: 34
End: 2024-01-30
Payer: COMMERCIAL

## 2024-01-30 VITALS
SYSTOLIC BLOOD PRESSURE: 111 MMHG | HEART RATE: 100 BPM | DIASTOLIC BLOOD PRESSURE: 76 MMHG | OXYGEN SATURATION: 98 % | WEIGHT: 184.6 LBS | BODY MASS INDEX: 30.72 KG/M2

## 2024-01-30 DIAGNOSIS — Z3A.35 35 WEEKS GESTATION OF PREGNANCY: Primary | ICD-10-CM

## 2024-01-30 LAB
APPEARANCE FLUID: ABNORMAL
BILIRUBIN, POC: ABNORMAL
BLOOD URINE, POC: NEGATIVE
C. TRACHOMATIS DNA ,URINE: NEGATIVE
CLARITY, POC: ABNORMAL
COLOR, POC: ABNORMAL
CULTURE: NORMAL
GLUCOSE URINE, POC: NEGATIVE
KETONES, POC: ABNORMAL
LEUKOCYTE EST, POC: ABNORMAL
N. GONORRHOEAE DNA, URINE: NEGATIVE
NITRITE, POC: NEGATIVE
PH, POC: ABNORMAL
PROTEIN, POC: NEGATIVE
SPECIFIC GRAVITY, POC: ABNORMAL
SPECIMEN DESCRIPTION: NORMAL
UROBILINOGEN, POC: ABNORMAL

## 2024-01-30 PROCEDURE — 0502F SUBSEQUENT PRENATAL CARE: CPT | Performed by: LEGAL MEDICINE

## 2024-01-30 PROCEDURE — 81002 URINALYSIS NONAUTO W/O SCOPE: CPT | Performed by: LEGAL MEDICINE

## 2024-01-30 RX ORDER — AZITHROMYCIN 250 MG/1
250 TABLET, FILM COATED ORAL SEE ADMIN INSTRUCTIONS
Qty: 6 TABLET | Refills: 0 | Status: SHIPPED | OUTPATIENT
Start: 2024-01-30 | End: 2024-02-04

## 2024-01-30 NOTE — TELEPHONE ENCOUNTER
Pt left VM on clinical line stating she's been getting more frequent headaches and inquiring if theres something she can do to help this or if this is normal in late pregnancy. Please advise.

## 2024-02-05 ENCOUNTER — ROUTINE PRENATAL (OUTPATIENT)
Age: 34
End: 2024-02-05
Payer: COMMERCIAL

## 2024-02-05 VITALS
SYSTOLIC BLOOD PRESSURE: 121 MMHG | WEIGHT: 184.3 LBS | BODY MASS INDEX: 30.67 KG/M2 | HEART RATE: 101 BPM | OXYGEN SATURATION: 98 % | DIASTOLIC BLOOD PRESSURE: 78 MMHG

## 2024-02-05 DIAGNOSIS — Z3A.36 36 WEEKS GESTATION OF PREGNANCY: Primary | ICD-10-CM

## 2024-02-05 LAB
APPEARANCE FLUID: CLEAR
BILIRUBIN, POC: NORMAL
BLOOD URINE, POC: NEGATIVE
CLARITY, POC: NORMAL
COLOR, POC: NORMAL
GLUCOSE URINE, POC: NEGATIVE
KETONES, POC: NORMAL
LEUKOCYTE EST, POC: NORMAL
NITRITE, POC: NEGATIVE
PH, POC: NORMAL
PROTEIN, POC: NEGATIVE
SPECIFIC GRAVITY, POC: NORMAL
UROBILINOGEN, POC: NORMAL

## 2024-02-05 PROCEDURE — 99902 PR PRENATAL VISIT: CPT | Performed by: LEGAL MEDICINE

## 2024-02-05 PROCEDURE — 81002 URINALYSIS NONAUTO W/O SCOPE: CPT | Performed by: LEGAL MEDICINE

## 2024-02-05 NOTE — PROGRESS NOTES
Glenny Ware    Patient presents for routine prenatal visit today at 36.3 weeks.  Has some cramping at night.  No weight gain since her last visit.  Continues to have headaches.  Not much relief with Tylenol.  Declines any other intervention or evaluation.    Denies complaints  Denies VB  Feeling movement at this time.  Reviewed labor precautions and kick counts.      /78 (Site: Right Upper Arm, Position: Sitting, Cuff Size: Medium Adult)   Pulse (!) 101   Wt 83.6 kg (184 lb 4.8 oz)   LMP 05/17/2023 (Exact Date)   SpO2 98%   BMI 30.67 kg/m²   Albumin: Negative  Glucose: Negative  Fundal height is  35         cm.  Fetal heart tones are in the 140's.  Fetus is in     cephalic           presentation.  Cervical exam closed and long.        All questions and concerns addressed at this time      ICD-10-CM    1. 36 weeks gestation of pregnancy  Z3A.36 POCT Urinalysis no Micro     Ambulatory referral to Maternal Fetal           Return in about 1 week (around 2/12/2024) for FOLLOW UP.    Denisse Valdivia MD

## 2024-02-12 ENCOUNTER — ROUTINE PRENATAL (OUTPATIENT)
Age: 34
End: 2024-02-12
Payer: COMMERCIAL

## 2024-02-12 VITALS
BODY MASS INDEX: 31.09 KG/M2 | HEART RATE: 109 BPM | WEIGHT: 186.8 LBS | SYSTOLIC BLOOD PRESSURE: 103 MMHG | DIASTOLIC BLOOD PRESSURE: 67 MMHG

## 2024-02-12 DIAGNOSIS — Z3A.37 37 WEEKS GESTATION OF PREGNANCY: Primary | ICD-10-CM

## 2024-02-12 LAB
APPEARANCE FLUID: CLEAR
BILIRUBIN, POC: NORMAL
BLOOD URINE, POC: NEGATIVE
CLARITY, POC: NORMAL
COLOR, POC: NORMAL
GLUCOSE URINE, POC: NEGATIVE
KETONES, POC: NORMAL
LEUKOCYTE EST, POC: NORMAL
PROTEIN, POC: NEGATIVE
SPECIFIC GRAVITY, POC: NORMAL
UROBILINOGEN, POC: NORMAL

## 2024-02-12 PROCEDURE — 0502F SUBSEQUENT PRENATAL CARE: CPT | Performed by: LEGAL MEDICINE

## 2024-02-12 PROCEDURE — 81002 URINALYSIS NONAUTO W/O SCOPE: CPT | Performed by: LEGAL MEDICINE

## 2024-02-12 NOTE — PROGRESS NOTES
Glenny Ware    Patient presents for routine prenatal visit today at 37.3 weeks.  She has had a 1.2 kg weight gain since her last visit.  Is feeling pressure.  Declines vaginal exam.      Denies VB, or cramping  Feeling movement at this time.  Reviewed labor precautions and kick counts.      /67 (Site: Right Upper Arm, Position: Sitting, Cuff Size: Large Adult)   Pulse (!) 109   Wt 84.7 kg (186 lb 12.8 oz)   LMP 05/17/2023 (Exact Date)   BMI 31.09 kg/m²   Albumin: Negative  Glucose: Negative    Fundal height is   38        cm.  Fetal heart tones are in the 140's.  Fetus is in     cephalic           presentation.    All questions and concerns addressed at this time      ICD-10-CM    1. 37 weeks gestation of pregnancy  Z3A.37 POCT Urinalysis no Micro           Return in about 1 week (around 2/19/2024) for FOLLOW UP.    Denisse Valdivia MD

## 2024-02-14 ENCOUNTER — HOSPITAL ENCOUNTER (OUTPATIENT)
Age: 34
Setting detail: OBSERVATION
Discharge: HOME | End: 2024-02-14
Attending: LEGAL MEDICINE | Admitting: LEGAL MEDICINE
Payer: COMMERCIAL

## 2024-02-14 VITALS
TEMPERATURE: 98 F | SYSTOLIC BLOOD PRESSURE: 112 MMHG | DIASTOLIC BLOOD PRESSURE: 70 MMHG | RESPIRATION RATE: 18 BRPM | HEART RATE: 80 BPM

## 2024-02-14 PROBLEM — Z34.93 NORMAL PREGNANCY IN THIRD TRIMESTER: Status: ACTIVE | Noted: 2024-02-14

## 2024-02-14 PROBLEM — R10.9 CRAMPING AFFECTING PREGNANCY, ANTEPARTUM: Status: ACTIVE | Noted: 2024-02-14

## 2024-02-14 PROBLEM — O26.899 CRAMPING AFFECTING PREGNANCY, ANTEPARTUM: Status: ACTIVE | Noted: 2024-02-14

## 2024-02-14 LAB
BACTERIA URNS QL MICRO: ABNORMAL
BILIRUB UR QL STRIP: NEGATIVE
CLARITY UR: ABNORMAL
COLOR UR: YELLOW
EPI CELLS #/AREA URNS HPF: ABNORMAL /HPF
GLUCOSE UR STRIP-MCNC: NEGATIVE MG/DL
HGB UR QL STRIP.AUTO: NEGATIVE
KETONES UR STRIP-MCNC: NEGATIVE MG/DL
LEUKOCYTE ESTERASE UR QL STRIP: ABNORMAL
NITRITE UR QL STRIP: NEGATIVE
PH UR STRIP: 6 [PH] (ref 5–9)
PROT UR STRIP-MCNC: NEGATIVE MG/DL
RBC #/AREA URNS HPF: ABNORMAL /HPF
SP GR UR STRIP: 1.02 (ref 1–1.03)
UROBILINOGEN UR STRIP-ACNC: 0.2 EU/DL (ref 0–1)
WBC #/AREA URNS HPF: ABNORMAL /HPF

## 2024-02-14 PROCEDURE — G0378 HOSPITAL OBSERVATION PER HR: HCPCS

## 2024-02-14 PROCEDURE — 99236 HOSP IP/OBS SAME DATE HI 85: CPT | Performed by: LEGAL MEDICINE

## 2024-02-14 PROCEDURE — 81001 URINALYSIS AUTO W/SCOPE: CPT

## 2024-02-14 PROCEDURE — 99211 OFF/OP EST MAY X REQ PHY/QHP: CPT

## 2024-02-14 PROCEDURE — 6370000000 HC RX 637 (ALT 250 FOR IP): Performed by: LEGAL MEDICINE

## 2024-02-14 RX ORDER — ONDANSETRON 2 MG/ML
4 INJECTION INTRAMUSCULAR; INTRAVENOUS EVERY 6 HOURS PRN
Status: DISCONTINUED | OUTPATIENT
Start: 2024-02-14 | End: 2024-02-14 | Stop reason: HOSPADM

## 2024-02-14 RX ORDER — FAMOTIDINE 20 MG/1
20 TABLET, FILM COATED ORAL 2 TIMES DAILY
Status: DISCONTINUED | OUTPATIENT
Start: 2024-02-14 | End: 2024-02-14

## 2024-02-14 RX ORDER — ONDANSETRON 4 MG/1
4 TABLET, ORALLY DISINTEGRATING ORAL EVERY 8 HOURS PRN
Status: DISCONTINUED | OUTPATIENT
Start: 2024-02-14 | End: 2024-02-14 | Stop reason: HOSPADM

## 2024-02-14 RX ORDER — ACETAMINOPHEN 500 MG
1000 TABLET ORAL EVERY 8 HOURS PRN
Status: DISCONTINUED | OUTPATIENT
Start: 2024-02-14 | End: 2024-02-14 | Stop reason: HOSPADM

## 2024-02-14 RX ORDER — FAMOTIDINE 20 MG/1
20 TABLET, FILM COATED ORAL 2 TIMES DAILY
Status: DISCONTINUED | OUTPATIENT
Start: 2024-02-14 | End: 2024-02-14 | Stop reason: HOSPADM

## 2024-02-14 RX ORDER — ACETAMINOPHEN 325 MG/1
650 TABLET ORAL EVERY 6 HOURS PRN
Status: DISCONTINUED | OUTPATIENT
Start: 2024-02-14 | End: 2024-02-14

## 2024-02-14 RX ADMIN — ACETAMINOPHEN 650 MG: 325 TABLET ORAL at 15:06

## 2024-02-14 RX ADMIN — FAMOTIDINE 20 MG: 20 TABLET, FILM COATED ORAL at 16:46

## 2024-02-14 NOTE — PROGRESS NOTES
Dr Valdivia updated on patient's arrival to unit, complaints, CTX/EFM tracing and vital signs. New orders received for UA, regular diet and to recheck cervix in 3 hours.

## 2024-02-14 NOTE — PROGRESS NOTES
37w5d patient arrived with c/o ctx since 3am appx 10 minutes apart at this time. No c/o of bleeding or leaking of fluids at this time. Oriented to triage room and placed on EFM/[place].

## 2024-02-14 NOTE — H&P
Department of Obstetrics and Gynecology  Attending Obstetrics History and Physical        CHIEF COMPLAINT: Cramping    HISTORY OF PRESENT ILLNESS:      33-year-old white female  2 para 1 presents with complaints of cramping on night with inability to sleep.  The pain became acutely worse 15 minutes ago.  No leaking fluid.  No vaginal bleeding.  No change in fetal movements.  No change in her bowel or urinary habits.      Past Medical History:        Diagnosis Date    Hypothyroidism      Past Surgical History:        Procedure Laterality Date    TONSILLECTOMY      TONSILLECTOMY N/A      Social History:    TOBACCO:   reports that she quit smoking about 3 years ago. Her smoking use included cigarettes. She started smoking about 13 years ago. She has a 2.5 pack-year smoking history. She has never used smokeless tobacco.  ETOH:   reports that she does not currently use alcohol.  DRUGS:   reports no history of drug use.  Family History:   No family history on file.  Medications Prior to Admission:  Medications Prior to Admission: levothyroxine (SYNTHROID) 50 MCG tablet, Take 1 tablet by mouth daily  Prenatal MV-Min-Fe Fum-FA-DHA (PRENATAL 1 PO), Take by mouth  ondansetron (ZOFRAN-ODT) 4 MG disintegrating tablet, Take 1 tablet by mouth 2 times daily as needed for Nausea or Vomiting Risk of birth defects reviewed. (Patient not taking: Reported on 10/9/2023)  Allergies:  Alavert [loratadine], Pcn [penicillins], and Pcn [penicillins]  Review of systems negative for cardiac, respiratory, GI, , neurologic, psychiatric, ENT, hematologic, lymphatic, constitutional abnormalities.    PHYSICAL EXAM:    General appearance:  awake, alert, cooperative, no apparent distress, and appears stated age  Neurologic:  Awake, alert, oriented to name, place and time.  Cranial nerves II-XII are grossly intact    Abdomen:  No scars, normal bowel sounds, soft, non-distended, non-tender, no masses palpated, no hepatosplenomegally  Fetal

## 2024-02-15 NOTE — PROGRESS NOTES
Dr Valdivia updated on SVE/EFM, patient request to walk at this time. Okay to walk at this time since strip is currently reactive.

## 2024-02-15 NOTE — DISCHARGE SUMMARY
Admitting diagnosis: Intrauterine pregnancy at 37 weeks and 5 days with cramping  Discharge diagnosis: Same, not in active labor    Patient remained under observation.  The contractions decreased in frequency to every 6 to 9 minutes.  Continued monitoring was offered, as was pain medication.  Other than Tylenol, she declined anything for pain.  She declined any further monitoring and opted to go home.  Fetal heart rate tracing remained reactive.  She was sent home with labor and fetal movement precautions and instructions to keep her follow-up appointment at the office.  She indicated understanding of all instructions.

## 2024-02-15 NOTE — PROGRESS NOTES
Dr. Valdivia updated on pt requesting to leave. Orders received to check pt for bleeding from SVE, give labor and fetal movement assessment discharge instructions. Ok to discharge pt if pt is not actively bleeding from SVE.

## 2024-02-15 NOTE — PROGRESS NOTES
RN bedside with discharge instructions. Pt seemed to be uncomfortable when RN entered the room. Pt was in the middle of a contractions. RN asked pt how she was feeling, that she looked uncomfortable and that if she was this uncomfortable that she should stay and be monitored longer. Pt states she is just a little uncomfortable with contractions but they are bearable and she would like to go home and sleep in her bed. Discharge instructions giving. RN advised pt to come back in if she gets more uncomfortable or her contractions get closer together even if it is in the nest hour. Pt verbalized understanding.

## 2024-02-15 NOTE — PROGRESS NOTES
RN bedside to check pt for vaginal bleeding. No bleeding noted, pt reports just some light spotting when she wipes after urinating.

## 2024-02-19 ENCOUNTER — ROUTINE PRENATAL (OUTPATIENT)
Age: 34
End: 2024-02-19

## 2024-02-19 ENCOUNTER — ANCILLARY PROCEDURE (OUTPATIENT)
Dept: OBGYN CLINIC | Age: 34
End: 2024-02-19
Payer: COMMERCIAL

## 2024-02-19 ENCOUNTER — ROUTINE PRENATAL (OUTPATIENT)
Dept: OBGYN CLINIC | Age: 34
End: 2024-02-19
Payer: COMMERCIAL

## 2024-02-19 VITALS
HEART RATE: 90 BPM | DIASTOLIC BLOOD PRESSURE: 86 MMHG | WEIGHT: 188 LBS | SYSTOLIC BLOOD PRESSURE: 118 MMHG | OXYGEN SATURATION: 98 % | BODY MASS INDEX: 31.28 KG/M2

## 2024-02-19 VITALS
WEIGHT: 188 LBS | BODY MASS INDEX: 31.28 KG/M2 | DIASTOLIC BLOOD PRESSURE: 72 MMHG | SYSTOLIC BLOOD PRESSURE: 111 MMHG | HEART RATE: 100 BPM

## 2024-02-19 DIAGNOSIS — Z3A.38 38 WEEKS GESTATION OF PREGNANCY: Primary | ICD-10-CM

## 2024-02-19 PROBLEM — Z3A.31 31 WEEKS GESTATION OF PREGNANCY: Status: RESOLVED | Noted: 2021-08-09 | Resolved: 2024-02-19

## 2024-02-19 LAB
GLUCOSE URINE, POC: NORMAL
PROTEIN UA: NEGATIVE

## 2024-02-19 PROCEDURE — 99999 PR OFFICE/OUTPT VISIT,PROCEDURE ONLY: CPT | Performed by: OBSTETRICS & GYNECOLOGY

## 2024-02-19 PROCEDURE — 99213 OFFICE O/P EST LOW 20 MIN: CPT | Performed by: OBSTETRICS & GYNECOLOGY

## 2024-02-19 PROCEDURE — 99902 PR PRENATAL VISIT: CPT | Performed by: LEGAL MEDICINE

## 2024-02-19 PROCEDURE — 81002 URINALYSIS NONAUTO W/O SCOPE: CPT | Performed by: OBSTETRICS & GYNECOLOGY

## 2024-02-19 PROCEDURE — 76816 OB US FOLLOW-UP PER FETUS: CPT | Performed by: OBSTETRICS & GYNECOLOGY

## 2024-02-19 NOTE — PROGRESS NOTES
Glenny Ware    Patient presents for routine prenatal visit today at 38.3 weeks.    Denies complaints  Denies VB, or cramping  Feeling movement at this time.  Reviewed labor precautions and kick counts.      /86   Pulse 90   Wt 85.3 kg (188 lb)   LMP 05/17/2023 (Exact Date)   SpO2 98%   BMI 31.28 kg/m²    Fundal height is  38         cm.  Fetal heart tones are in the 140's.  Fetus is in     cephalic           presentation.    Cervical exam 3/50/VX/0/POSTERIOR.    Was seen at [facility] today.    All questions and concerns addressed at this time      ICD-10-CM    1. 38 weeks gestation of pregnancy  Z3A.38            Return in about 2 days (around 2/21/2024) for FOLLOW UP.    Denisse Valdivia MD

## 2024-02-19 NOTE — PROGRESS NOTES
Patient is here today for growth scan. Noticed a blood show last week, nothing since. Cramping on and off.   Patient reports good fetal movement.       
is normal.    IMPRESSION:  1. Single intrauterine gestation at 38+ weeks with appropriate interval growth.  2.  No obvious fetal anomalies are noted.  The fetus is in a vertex presentation.  3.  The amniotic fluid volume is normal and the placenta is posterior.    RECOMMENDATIONS:  Each of the recommendations were discussed with the patient:  1.  Consider delivery at 39 weeks gestation.  \2.  Follow-up would be otherwise as clinically indicated.    The patient is to continue to follow with you in your office for ongoing obstetric care.     PLAN:    As noted above or sooner prn.    Sincerely,        James Del Real MD

## 2024-02-21 ENCOUNTER — ROUTINE PRENATAL (OUTPATIENT)
Age: 34
End: 2024-02-21
Payer: COMMERCIAL

## 2024-02-21 VITALS
BODY MASS INDEX: 31.28 KG/M2 | DIASTOLIC BLOOD PRESSURE: 81 MMHG | SYSTOLIC BLOOD PRESSURE: 119 MMHG | HEART RATE: 94 BPM | WEIGHT: 188 LBS

## 2024-02-21 DIAGNOSIS — Z3A.38 38 WEEKS GESTATION OF PREGNANCY: Primary | ICD-10-CM

## 2024-02-21 LAB
BILIRUBIN, POC: NORMAL
BLOOD URINE, POC: NEGATIVE
CLARITY, POC: NORMAL
COLOR, POC: NORMAL
GLUCOSE URINE, POC: NEGATIVE
KETONES, POC: NORMAL
LEUKOCYTE EST, POC: NORMAL
NITRITE, POC: NEGATIVE
PH, POC: NORMAL
PROTEIN, POC: NEGATIVE
SPECIFIC GRAVITY, POC: NORMAL
UROBILINOGEN, POC: NORMAL

## 2024-02-21 PROCEDURE — 81002 URINALYSIS NONAUTO W/O SCOPE: CPT | Performed by: LEGAL MEDICINE

## 2024-02-21 PROCEDURE — 59426 ANTEPARTUM CARE ONLY: CPT | Performed by: LEGAL MEDICINE

## 2024-02-21 NOTE — PROGRESS NOTES
Glenny Ware    Patient presents for routine prenatal visit today at 38.5 weeks.  No complaints.  No weight gain since her last visit.  Total weight gain with the pregnancy is 24 pounds.    Denies complaints  Denies VB, or cramping  Feeling movement at this time.  Reviewed labor precautions and kick counts.      /81   Pulse 94   Wt 85.3 kg (188 lb)   LMP 05/17/2023 (Exact Date)   BMI 31.28 kg/m²   Albumin: Negative  Glucose: Negative  Fundal height is   38        cm.  Fetal heart tones are in the 140's.  Fetus is in     cephalic           presentation.  Cervical exam 4/50/vertex/-1.  GBS is negative.  GC chlamydia is negative.  Membrane sweep performed per patient request.    For induction in 2 days, if not in labor by then    All questions and concerns addressed at this time      ICD-10-CM    1. 38 weeks gestation of pregnancy  Z3A.38 POCT Urinalysis no Micro           Return in about 6 weeks (around 4/3/2024) for postpartum.    Denisse Valdivia MD

## 2024-02-23 ENCOUNTER — ANESTHESIA EVENT (OUTPATIENT)
Dept: LABOR AND DELIVERY | Age: 34
End: 2024-02-23
Payer: COMMERCIAL

## 2024-02-23 ENCOUNTER — HOSPITAL ENCOUNTER (INPATIENT)
Age: 34
LOS: 1 days | Discharge: HOME | End: 2024-02-24
Attending: LEGAL MEDICINE | Admitting: LEGAL MEDICINE
Payer: COMMERCIAL

## 2024-02-23 ENCOUNTER — ANESTHESIA (OUTPATIENT)
Dept: LABOR AND DELIVERY | Age: 34
End: 2024-02-23
Payer: COMMERCIAL

## 2024-02-23 ENCOUNTER — APPOINTMENT (OUTPATIENT)
Dept: LABOR AND DELIVERY | Age: 34
End: 2024-02-23
Payer: COMMERCIAL

## 2024-02-23 PROBLEM — Z3A.39 39 WEEKS GESTATION OF PREGNANCY: Status: ACTIVE | Noted: 2024-02-23

## 2024-02-23 LAB
ABO + RH BLD: NORMAL
ALBUMIN SERPL-MCNC: 3.3 G/DL (ref 3.5–5.2)
ALP SERPL-CCNC: 132 U/L (ref 35–104)
ALT SERPL-CCNC: 6 U/L (ref 0–32)
AMPHET UR QL SCN: NEGATIVE
ANION GAP SERPL CALCULATED.3IONS-SCNC: 13 MMOL/L (ref 7–16)
ARM BAND NUMBER: NORMAL
AST SERPL-CCNC: 11 U/L (ref 0–31)
BACTERIA URNS QL MICRO: ABNORMAL
BARBITURATES UR QL SCN: NEGATIVE
BENZODIAZ UR QL: NEGATIVE
BILIRUB SERPL-MCNC: 0.2 MG/DL (ref 0–1.2)
BILIRUB UR QL STRIP: NEGATIVE
BLOOD BANK SAMPLE EXPIRATION: NORMAL
BLOOD GROUP ANTIBODIES SERPL: NEGATIVE
BUN SERPL-MCNC: 6 MG/DL (ref 6–20)
BUPRENORPHINE UR QL: NEGATIVE
CALCIUM SERPL-MCNC: 8.7 MG/DL (ref 8.6–10.2)
CANNABINOIDS UR QL SCN: NEGATIVE
CHLORIDE SERPL-SCNC: 104 MMOL/L (ref 98–107)
CLARITY UR: CLEAR
CO2 SERPL-SCNC: 18 MMOL/L (ref 22–29)
COCAINE UR QL SCN: NEGATIVE
COLOR UR: YELLOW
CREAT SERPL-MCNC: 0.6 MG/DL (ref 0.5–1)
EPI CELLS #/AREA URNS HPF: ABNORMAL /HPF
ERYTHROCYTE [DISTWIDTH] IN BLOOD BY AUTOMATED COUNT: 13.5 % (ref 11.5–15)
FENTANYL UR QL: NEGATIVE
GFR SERPL CREATININE-BSD FRML MDRD: >60 ML/MIN/1.73M2
GLUCOSE SERPL-MCNC: 81 MG/DL (ref 74–99)
GLUCOSE UR STRIP-MCNC: NEGATIVE MG/DL
HCT VFR BLD AUTO: 31.2 % (ref 34–48)
HGB BLD-MCNC: 10.7 G/DL (ref 11.5–15.5)
HGB UR QL STRIP.AUTO: NEGATIVE
KETONES UR STRIP-MCNC: NEGATIVE MG/DL
LEUKOCYTE ESTERASE UR QL STRIP: ABNORMAL
MCH RBC QN AUTO: 30.7 PG (ref 26–35)
MCHC RBC AUTO-ENTMCNC: 34.3 G/DL (ref 32–34.5)
MCV RBC AUTO: 89.7 FL (ref 80–99.9)
METHADONE UR QL: NEGATIVE
NITRITE UR QL STRIP: NEGATIVE
OPIATES UR QL SCN: NEGATIVE
OXYCODONE UR QL SCN: NEGATIVE
PCP UR QL SCN: NEGATIVE
PH UR STRIP: 6 [PH] (ref 5–9)
PLATELET # BLD AUTO: 205 K/UL (ref 130–450)
PMV BLD AUTO: 10.7 FL (ref 7–12)
POTASSIUM SERPL-SCNC: 4.1 MMOL/L (ref 3.5–5)
PROT SERPL-MCNC: 5.9 G/DL (ref 6.4–8.3)
PROT UR STRIP-MCNC: NEGATIVE MG/DL
RBC # BLD AUTO: 3.48 M/UL (ref 3.5–5.5)
RBC #/AREA URNS HPF: ABNORMAL /HPF
SODIUM SERPL-SCNC: 135 MMOL/L (ref 132–146)
SP GR UR STRIP: 1.02 (ref 1–1.03)
TEST INFORMATION: NORMAL
UROBILINOGEN UR STRIP-ACNC: 0.2 EU/DL (ref 0–1)
WBC #/AREA URNS HPF: ABNORMAL /HPF
WBC OTHER # BLD: 9.5 K/UL (ref 4.5–11.5)

## 2024-02-23 PROCEDURE — 88307 TISSUE EXAM BY PATHOLOGIST: CPT

## 2024-02-23 PROCEDURE — 3700000025 EPIDURAL BLOCK: Performed by: ANESTHESIOLOGY

## 2024-02-23 PROCEDURE — 2500000003 HC RX 250 WO HCPCS: Performed by: ANESTHESIOLOGY

## 2024-02-23 PROCEDURE — 6370000000 HC RX 637 (ALT 250 FOR IP): Performed by: LEGAL MEDICINE

## 2024-02-23 PROCEDURE — 6360000002 HC RX W HCPCS: Performed by: LEGAL MEDICINE

## 2024-02-23 PROCEDURE — 59410 OBSTETRICAL CARE: CPT | Performed by: LEGAL MEDICINE

## 2024-02-23 PROCEDURE — 86850 RBC ANTIBODY SCREEN: CPT

## 2024-02-23 PROCEDURE — 80307 DRUG TEST PRSMV CHEM ANLYZR: CPT

## 2024-02-23 PROCEDURE — 86900 BLOOD TYPING SEROLOGIC ABO: CPT

## 2024-02-23 PROCEDURE — 7200000001 HC VAGINAL DELIVERY

## 2024-02-23 PROCEDURE — 81001 URINALYSIS AUTO W/SCOPE: CPT

## 2024-02-23 PROCEDURE — 10907ZC DRAINAGE OF AMNIOTIC FLUID, THERAPEUTIC FROM PRODUCTS OF CONCEPTION, VIA NATURAL OR ARTIFICIAL OPENING: ICD-10-PCS | Performed by: LEGAL MEDICINE

## 2024-02-23 PROCEDURE — 80053 COMPREHEN METABOLIC PANEL: CPT

## 2024-02-23 PROCEDURE — 85027 COMPLETE CBC AUTOMATED: CPT

## 2024-02-23 PROCEDURE — 2580000003 HC RX 258: Performed by: LEGAL MEDICINE

## 2024-02-23 PROCEDURE — 86901 BLOOD TYPING SEROLOGIC RH(D): CPT

## 2024-02-23 PROCEDURE — 86592 SYPHILIS TEST NON-TREP QUAL: CPT

## 2024-02-23 PROCEDURE — 1220000001 HC SEMI PRIVATE L&D R&B

## 2024-02-23 RX ORDER — OXYCODONE HYDROCHLORIDE 5 MG/1
5 TABLET ORAL EVERY 6 HOURS PRN
Status: DISCONTINUED | OUTPATIENT
Start: 2024-02-23 | End: 2024-02-24 | Stop reason: HOSPADM

## 2024-02-23 RX ORDER — SODIUM CHLORIDE, SODIUM LACTATE, POTASSIUM CHLORIDE, CALCIUM CHLORIDE 600; 310; 30; 20 MG/100ML; MG/100ML; MG/100ML; MG/100ML
INJECTION, SOLUTION INTRAVENOUS
Status: DISCONTINUED | OUTPATIENT
Start: 2024-02-23 | End: 2024-02-23

## 2024-02-23 RX ORDER — LEVOTHYROXINE SODIUM 0.05 MG/1
50 TABLET ORAL DAILY
Status: DISCONTINUED | OUTPATIENT
Start: 2024-02-24 | End: 2024-02-24 | Stop reason: HOSPADM

## 2024-02-23 RX ORDER — ONDANSETRON 2 MG/ML
4 INJECTION INTRAMUSCULAR; INTRAVENOUS EVERY 6 HOURS PRN
Status: DISCONTINUED | OUTPATIENT
Start: 2024-02-23 | End: 2024-02-23

## 2024-02-23 RX ORDER — LIDOCAINE HYDROCHLORIDE 10 MG/ML
30 INJECTION, SOLUTION EPIDURAL; INFILTRATION; INTRACAUDAL; PERINEURAL PRN
Status: DISCONTINUED | OUTPATIENT
Start: 2024-02-23 | End: 2024-02-23

## 2024-02-23 RX ORDER — IBUPROFEN 600 MG/1
600 TABLET ORAL
Status: DISCONTINUED | OUTPATIENT
Start: 2024-02-24 | End: 2024-02-24 | Stop reason: HOSPADM

## 2024-02-23 RX ORDER — SODIUM CHLORIDE, SODIUM LACTATE, POTASSIUM CHLORIDE, AND CALCIUM CHLORIDE .6; .31; .03; .02 G/100ML; G/100ML; G/100ML; G/100ML
500 INJECTION, SOLUTION INTRAVENOUS PRN
Status: DISCONTINUED | OUTPATIENT
Start: 2024-02-23 | End: 2024-02-23

## 2024-02-23 RX ORDER — SODIUM CHLORIDE 0.9 % (FLUSH) 0.9 %
5-40 SYRINGE (ML) INJECTION PRN
Status: DISCONTINUED | OUTPATIENT
Start: 2024-02-23 | End: 2024-02-24 | Stop reason: HOSPADM

## 2024-02-23 RX ORDER — DOCUSATE SODIUM 100 MG/1
100 CAPSULE, LIQUID FILLED ORAL 2 TIMES DAILY
Status: DISCONTINUED | OUTPATIENT
Start: 2024-02-23 | End: 2024-02-24 | Stop reason: HOSPADM

## 2024-02-23 RX ORDER — NALOXONE HYDROCHLORIDE 0.4 MG/ML
INJECTION, SOLUTION INTRAMUSCULAR; INTRAVENOUS; SUBCUTANEOUS PRN
Status: DISCONTINUED | OUTPATIENT
Start: 2024-02-23 | End: 2024-02-23

## 2024-02-23 RX ORDER — SODIUM CHLORIDE, SODIUM LACTATE, POTASSIUM CHLORIDE, CALCIUM CHLORIDE 600; 310; 30; 20 MG/100ML; MG/100ML; MG/100ML; MG/100ML
INJECTION, SOLUTION INTRAVENOUS CONTINUOUS
Status: DISCONTINUED | OUTPATIENT
Start: 2024-02-23 | End: 2024-02-23

## 2024-02-23 RX ORDER — SODIUM CHLORIDE 0.9 % (FLUSH) 0.9 %
5-40 SYRINGE (ML) INJECTION EVERY 12 HOURS SCHEDULED
Status: DISCONTINUED | OUTPATIENT
Start: 2024-02-23 | End: 2024-02-23

## 2024-02-23 RX ORDER — SODIUM CHLORIDE 0.9 % (FLUSH) 0.9 %
5-40 SYRINGE (ML) INJECTION PRN
Status: DISCONTINUED | OUTPATIENT
Start: 2024-02-23 | End: 2024-02-23

## 2024-02-23 RX ORDER — MODIFIED LANOLIN
OINTMENT (GRAM) TOPICAL PRN
Status: DISCONTINUED | OUTPATIENT
Start: 2024-02-23 | End: 2024-02-24 | Stop reason: HOSPADM

## 2024-02-23 RX ORDER — FERROUS SULFATE 325(65) MG
325 TABLET ORAL 2 TIMES DAILY WITH MEALS
Status: DISCONTINUED | OUTPATIENT
Start: 2024-02-23 | End: 2024-02-24 | Stop reason: HOSPADM

## 2024-02-23 RX ORDER — CARBOPROST TROMETHAMINE 250 UG/ML
250 INJECTION, SOLUTION INTRAMUSCULAR PRN
Status: DISCONTINUED | OUTPATIENT
Start: 2024-02-23 | End: 2024-02-23

## 2024-02-23 RX ORDER — OXYCODONE HYDROCHLORIDE 5 MG/1
10 TABLET ORAL EVERY 4 HOURS PRN
Status: DISCONTINUED | OUTPATIENT
Start: 2024-02-23 | End: 2024-02-24 | Stop reason: HOSPADM

## 2024-02-23 RX ORDER — SODIUM CHLORIDE 9 MG/ML
INJECTION, SOLUTION INTRAVENOUS PRN
Status: DISCONTINUED | OUTPATIENT
Start: 2024-02-23 | End: 2024-02-24 | Stop reason: HOSPADM

## 2024-02-23 RX ORDER — EPHEDRINE SULFATE/0.9% NACL/PF 25 MG/5 ML
10 SYRINGE (ML) INTRAVENOUS AS NEEDED
Status: DISCONTINUED | OUTPATIENT
Start: 2024-02-23 | End: 2024-02-23

## 2024-02-23 RX ORDER — MISOPROSTOL 200 UG/1
800 TABLET ORAL PRN
Status: DISCONTINUED | OUTPATIENT
Start: 2024-02-23 | End: 2024-02-23

## 2024-02-23 RX ORDER — SODIUM CHLORIDE 9 MG/ML
INJECTION, SOLUTION INTRAVENOUS PRN
Status: DISCONTINUED | OUTPATIENT
Start: 2024-02-23 | End: 2024-02-23

## 2024-02-23 RX ORDER — METHYLERGONOVINE MALEATE 0.2 MG/ML
200 INJECTION INTRAVENOUS PRN
Status: DISCONTINUED | OUTPATIENT
Start: 2024-02-23 | End: 2024-02-23

## 2024-02-23 RX ORDER — ACETAMINOPHEN 500 MG
1000 TABLET ORAL EVERY 8 HOURS
Status: DISCONTINUED | OUTPATIENT
Start: 2024-02-23 | End: 2024-02-24 | Stop reason: HOSPADM

## 2024-02-23 RX ORDER — SODIUM CHLORIDE 0.9 % (FLUSH) 0.9 %
5-40 SYRINGE (ML) INJECTION EVERY 12 HOURS SCHEDULED
Status: DISCONTINUED | OUTPATIENT
Start: 2024-02-23 | End: 2024-02-24 | Stop reason: HOSPADM

## 2024-02-23 RX ORDER — SODIUM CHLORIDE, SODIUM LACTATE, POTASSIUM CHLORIDE, CALCIUM CHLORIDE 600; 310; 30; 20 MG/100ML; MG/100ML; MG/100ML; MG/100ML
INJECTION, SOLUTION INTRAVENOUS CONTINUOUS
Status: DISCONTINUED | OUTPATIENT
Start: 2024-02-23 | End: 2024-02-24 | Stop reason: HOSPADM

## 2024-02-23 RX ORDER — SODIUM CHLORIDE, SODIUM LACTATE, POTASSIUM CHLORIDE, AND CALCIUM CHLORIDE .6; .31; .03; .02 G/100ML; G/100ML; G/100ML; G/100ML
1000 INJECTION, SOLUTION INTRAVENOUS PRN
Status: DISCONTINUED | OUTPATIENT
Start: 2024-02-23 | End: 2024-02-23

## 2024-02-23 RX ADMIN — Medication: at 22:14

## 2024-02-23 RX ADMIN — Medication 15 ML/HR: at 16:47

## 2024-02-23 RX ADMIN — DOCUSATE SODIUM 100 MG: 100 CAPSULE, LIQUID FILLED ORAL at 22:14

## 2024-02-23 RX ADMIN — SODIUM CHLORIDE, POTASSIUM CHLORIDE, SODIUM LACTATE AND CALCIUM CHLORIDE: 600; 310; 30; 20 INJECTION, SOLUTION INTRAVENOUS at 08:40

## 2024-02-23 RX ADMIN — ACETAMINOPHEN 1000 MG: 500 TABLET ORAL at 22:46

## 2024-02-23 RX ADMIN — Medication 15 ML: at 16:38

## 2024-02-23 RX ADMIN — Medication 2 MILLI-UNITS/MIN: at 12:00

## 2024-02-23 RX ADMIN — WITCH HAZEL: 500 SOLUTION RECTAL; TOPICAL at 22:14

## 2024-02-23 NOTE — H&P
Department of Obstetrics and Gynecology  Attending Obstetrics History and Physical        CHIEF COMPLAINT:  presenting for induction    HISTORY OF PRESENT ILLNESS:      33-year-old white female  2 para 1 presents at 39 weeks for induction and she is declining further expectant management of pregnancy.  Group B strep is negative.  No viral prodrome.  No leaking fluid or vaginal bleeding.  No change in fetal movements.      Past Medical History:        Diagnosis Date    Hypothyroidism      Past Surgical History:        Procedure Laterality Date    TONSILLECTOMY      TONSILLECTOMY N/A      Social History:    TOBACCO:   reports that she quit smoking about 3 years ago. Her smoking use included cigarettes. She started smoking about 13 years ago. She has a 2.5 pack-year smoking history. She has never used smokeless tobacco.  ETOH:   reports that she does not currently use alcohol.  DRUGS:   reports no history of drug use.  Family History:   History reviewed. No pertinent family history.  Medications Prior to Admission:  Medications Prior to Admission: levothyroxine (SYNTHROID) 50 MCG tablet, Take 1 tablet by mouth daily  Prenatal MV-Min-Fe Fum-FA-DHA (PRENATAL 1 PO), Take by mouth  [DISCONTINUED] ondansetron (ZOFRAN-ODT) 4 MG disintegrating tablet, Take 1 tablet by mouth 2 times daily as needed for Nausea or Vomiting Risk of birth defects reviewed. (Patient not taking: Reported on 10/9/2023)  Allergies:  Alavert [loratadine], Pcn [penicillins], and Pcn [penicillins]      PHYSICAL EXAM:    General appearance:  awake, alert, cooperative, no apparent distress, and appears stated age  Neurologic:  Awake, alert, oriented to name, place and time.  Cranial nerves II-XII are grossly intact.   Lungs:  No increased work of breathing, good air exchange, clear to auscultation bilaterally, no crackles or wheezing  Heart:  Normal apical impulse, regular rate and rhythm, normal S1 and S2, no S3 or S4, and no murmur

## 2024-02-23 NOTE — ANESTHESIA PRE PROCEDURE
Years since quitting: 3.2    Smokeless tobacco: Never   Substance Use Topics    Alcohol use: Not Currently                                Counseling given: Not Answered      Vital Signs (Current):   Vitals:    02/23/24 1400 02/23/24 1430 02/23/24 1530 02/23/24 1600   BP: (!) 111/59 100/63 113/69 119/70   Pulse: 87 79 80 79   Resp: 16   16   Temp: 36.9 °C (98.4 °F)   36.6 °C (97.8 °F)   TempSrc: Oral   Oral   SpO2:    99%   Weight:       Height:                                                  BP Readings from Last 3 Encounters:   02/23/24 119/70   02/21/24 119/81   02/19/24 118/86       NPO Status:                                                                                 BMI:   Wt Readings from Last 3 Encounters:   02/23/24 85.3 kg (188 lb)   02/21/24 85.3 kg (188 lb)   02/19/24 85.3 kg (188 lb)     Body mass index is 31.28 kg/m².    CBC:   Lab Results   Component Value Date/Time    WBC 9.5 02/23/2024 08:40 AM    RBC 3.48 02/23/2024 08:40 AM    HGB 10.7 02/23/2024 08:40 AM    HCT 31.2 02/23/2024 08:40 AM    MCV 89.7 02/23/2024 08:40 AM    RDW 13.5 02/23/2024 08:40 AM     02/23/2024 08:40 AM       CMP:   Lab Results   Component Value Date/Time     02/23/2024 08:40 AM    K 4.1 02/23/2024 08:40 AM     02/23/2024 08:40 AM    CO2 18 02/23/2024 08:40 AM    BUN 6 02/23/2024 08:40 AM    CREATININE 0.6 02/23/2024 08:40 AM    GFRAA >60 08/09/2021 11:45 AM    LABGLOM >60 02/23/2024 08:40 AM    GLUCOSE 81 02/23/2024 08:40 AM    PROT 5.9 02/23/2024 08:40 AM    CALCIUM 8.7 02/23/2024 08:40 AM    BILITOT 0.2 02/23/2024 08:40 AM    ALKPHOS 132 02/23/2024 08:40 AM    AST 11 02/23/2024 08:40 AM    ALT 6 02/23/2024 08:40 AM       POC Tests: No results for input(s): \"POCGLU\", \"POCNA\", \"POCK\", \"POCCL\", \"POCBUN\", \"POCHEMO\", \"POCHCT\" in the last 72 hours.    Coags: No results found for: \"PROTIME\", \"INR\", \"APTT\"    HCG (If Applicable):   Lab Results   Component Value Date    PREGTESTUR neg 02/18/2013        ABGs:

## 2024-02-23 NOTE — PROGRESS NOTES
Cervix 5/80/Vx/-1/OP  FHT's 140 with beat to beat variability present, accelerations noted. Contractions q 1-2 minutes.  Patient comfortable with epidural.  AROM with clear fluid.

## 2024-02-23 NOTE — ANESTHESIA PROCEDURE NOTES
Epidural Block    Patient location during procedure: OB  Start time: 2/23/2024 4:20 PM  End time: 2/23/2024 4:57 PM  Reason for block: labor epidural  Staffing  Performed: resident/CRNA   Anesthesiologist: Juan Luis Pedro MD  Resident/CRNA: Marcelo Diaz APRN - CRNA  Performed by: Marcelo Diaz APRN - CRNA  Authorized by: Juan Luis Pedro MD    Epidural  Patient position: sitting  Prep: Betadine and site prepped and draped  Patient monitoring: cardiac monitor, continuous pulse ox and frequent blood pressure checks  Approach: midline  Location: L3-4  Injection technique: KAELA air and KAELA saline  Provider prep: mask and sterile gloves  Needle  Needle type: Tuohy   Needle gauge: 18 G  Needle length: 3.5 in  Needle insertion depth: 6 cm  Catheter type: end hole  Catheter at skin depth: 10 cm  Test dose: negativeCatheter Secured: tegaderm and tape  Assessment  Sensory level: T4  Hemodynamics: stable  Attempts: 1  Outcomes: uncomplicated and patient tolerated procedure well  Preanesthetic Checklist  Completed: patient identified, IV checked, site marked, risks and benefits discussed, surgical/procedural consents, equipment checked, pre-op evaluation, timeout performed, anesthesia consent given, oxygen available and monitors applied/VS acknowledged

## 2024-02-23 NOTE — PROGRESS NOTES
Patient arrived to unit for scheduled IOL. Patient oriented to room and call light. Monitors applied. Plan of care discussed with patient. SVE performed. Dr. Valdivia notified of patient arrival and exam. OK to admit.

## 2024-02-23 NOTE — PROGRESS NOTES
Kannan MENJIVAR in room for procedure at 1620  Patient sitting at side of bed for epidural insertion at 1625  Epidural catheter placed at 1639  Test dose given by Kannan MENJIVAR at 1640  Epidural bolus given by Kannan MENJIVAR at 1638  Patient returned to semi-porter's position in bed at 1645  Epidural pump programmed to continuously infuse by Kannan MENJIVAR at 1646

## 2024-02-24 VITALS
WEIGHT: 188 LBS | DIASTOLIC BLOOD PRESSURE: 70 MMHG | TEMPERATURE: 98.2 F | BODY MASS INDEX: 31.32 KG/M2 | HEART RATE: 76 BPM | SYSTOLIC BLOOD PRESSURE: 115 MMHG | OXYGEN SATURATION: 99 % | RESPIRATION RATE: 17 BRPM | HEIGHT: 65 IN

## 2024-02-24 PROBLEM — Z34.90 SECOND PREGNANCY: Status: RESOLVED | Noted: 2023-10-09 | Resolved: 2024-02-24

## 2024-02-24 PROBLEM — O26.899 CRAMPING AFFECTING PREGNANCY, ANTEPARTUM: Status: RESOLVED | Noted: 2024-02-14 | Resolved: 2024-02-24

## 2024-02-24 PROBLEM — Z3A.38 38 WEEKS GESTATION OF PREGNANCY: Status: RESOLVED | Noted: 2024-01-10 | Resolved: 2024-02-24

## 2024-02-24 PROBLEM — R10.9 CRAMPING AFFECTING PREGNANCY, ANTEPARTUM: Status: RESOLVED | Noted: 2024-02-14 | Resolved: 2024-02-24

## 2024-02-24 PROBLEM — Z34.93 NORMAL PREGNANCY IN THIRD TRIMESTER: Status: RESOLVED | Noted: 2024-02-14 | Resolved: 2024-02-24

## 2024-02-24 LAB — HGB BLD-MCNC: 11.6 G/DL (ref 11.5–15.5)

## 2024-02-24 PROCEDURE — 6370000000 HC RX 637 (ALT 250 FOR IP): Performed by: LEGAL MEDICINE

## 2024-02-24 PROCEDURE — 85018 HEMOGLOBIN: CPT

## 2024-02-24 RX ADMIN — IBUPROFEN 600 MG: 600 TABLET, FILM COATED ORAL at 17:11

## 2024-02-24 RX ADMIN — IBUPROFEN 600 MG: 600 TABLET, FILM COATED ORAL at 12:52

## 2024-02-24 RX ADMIN — DOCUSATE SODIUM 100 MG: 100 CAPSULE, LIQUID FILLED ORAL at 09:01

## 2024-02-24 RX ADMIN — IBUPROFEN 600 MG: 600 TABLET, FILM COATED ORAL at 09:01

## 2024-02-24 RX ADMIN — ACETAMINOPHEN 1000 MG: 500 TABLET ORAL at 06:46

## 2024-02-24 ASSESSMENT — PAIN DESCRIPTION - DESCRIPTORS
DESCRIPTORS: CRAMPING
DESCRIPTORS: DISCOMFORT;CRAMPING;SORE

## 2024-02-24 ASSESSMENT — PAIN SCALES - GENERAL
PAINLEVEL_OUTOF10: 6
PAINLEVEL_OUTOF10: 3

## 2024-02-24 ASSESSMENT — PAIN DESCRIPTION - ORIENTATION
ORIENTATION: DISTAL
ORIENTATION: LOWER

## 2024-02-24 ASSESSMENT — PAIN - FUNCTIONAL ASSESSMENT
PAIN_FUNCTIONAL_ASSESSMENT: ACTIVITIES ARE NOT PREVENTED
PAIN_FUNCTIONAL_ASSESSMENT: ACTIVITIES ARE NOT PREVENTED

## 2024-02-24 ASSESSMENT — PAIN DESCRIPTION - LOCATION
LOCATION: ABDOMEN
LOCATION: ABDOMEN

## 2024-02-24 NOTE — PROGRESS NOTES
Patient up to shower, with minimal assist, for first time post delivery. Linens changed. Patient medicated per MAR. No other complaints at this time. Call light in reach.

## 2024-02-24 NOTE — PLAN OF CARE
Problem: Vaginal Birth or  Section  Goal: Fetal and maternal status remain reassuring during the birth process  Description:  Birth OB-Pregnancy care plan goal which identifies if the fetal and maternal status remain reassuring during the birth process  2024 103 by Ermias Duran RN  Outcome: Progressing  2024 234 by Kayleen Cardenas RN  Outcome: Progressing     Problem: Postpartum  Goal: Experiences normal postpartum course  Description:  Postpartum OB-Pregnancy care plan goal which identifies if the mother is experiencing a normal postpartum course  2024 1034 by Ermias Duran RN  Outcome: Progressing  2024 234 by Kayleen Cardenas RN  Outcome: Progressing  Goal: Appropriate maternal -  bonding  Description:  Postpartum OB-Pregnancy care plan goal which identifies if the mother and  are bonding appropriately  2024 by Ermias Duran RN  Outcome: Progressing  2024 by Kayleen Cardenas RN  Outcome: Progressing  Goal: Establishment of infant feeding pattern  Description:  Postpartum OB-Pregnancy care plan goal which identifies if the mother is establishing a feeding pattern with their   2024 1034 by Ermias Duran RN  Outcome: Progressing  2024 234 by Kayleen Cardenas RN  Outcome: Progressing  Goal: Incisions, wounds, or drain sites healing without S/S of infection  2024 by Ermias Duran RN  Outcome: Progressing  2024 by Kayleen Cardenas RN  Outcome: Progressing     Problem: Pain  Goal: Verbalizes/displays adequate comfort level or baseline comfort level  2024 103 by Ermias Duran RN  Outcome: Progressing  2024 234 by Kayleen Cardenas RN  Outcome: Progressing     Problem: Infection - Adult  Goal: Absence of infection at discharge  Outcome: Progressing  Goal: Absence of infection during hospitalization  Outcome: Progressing  Goal: Absence of fever/infection during anticipated neutropenic

## 2024-02-24 NOTE — ANESTHESIA POSTPROCEDURE EVALUATION
Department of Anesthesiology  Postprocedure Note    Patient: Glenny Waer  MRN: [id number]  YOB: 1990  Date of evaluation: 2/24/2024    Procedure Summary       Date: 02/23/24 Room / Location:     Anesthesia Start: 1620 Anesthesia Stop: 1926    Procedure: Labor Analgesia Diagnosis:     Scheduled Providers:  Responsible Provider: Juan Luis Pedro MD    Anesthesia Type: epidural ASA Status: 2            Anesthesia Type: No value filed.    Mary Phase I:      Mary Phase II:      Anesthesia Post Evaluation    Patient location during evaluation: bedside  Patient participation: complete - patient cannot participate  Level of consciousness: awake  Pain score: 2  Airway patency: patent  Nausea & Vomiting: no nausea  Cardiovascular status: blood pressure returned to baseline  Respiratory status: acceptable  Hydration status: euvolemic  Pain management: adequate    No notable events documented.

## 2024-02-24 NOTE — PROGRESS NOTES
[employer] [place] [employer] screening results were discussed with parent. Questions answered. Brochure given to parent. Advised to monitor developmental milestones and contact physician for any concerns.   Cornelio Garcia, AuD

## 2024-02-24 NOTE — DISCHARGE INSTRUCTIONS
No sex, no tub baths, no swimming, nothing in the vagina for 6 weeks.  Follow-up to the office in 6 weeks.

## 2024-02-24 NOTE — PROGRESS NOTES
Assumed care of pt for the [id number] shift. Assessment and vitals completed. Pt with no needs at this time. POC discussed. Call light in reach.

## 2024-02-24 NOTE — DISCHARGE SUMMARY
Admitting diagnosis: Intrauterine pregnancy at 39 weeks declining further expectant management of pregnancy  Discharge diagnosis: Same  Procedure performed: Spontaneous vaginal delivery  Hospital course in detail:  Doing well. Voiding well. Minimal lochia. Pain under adequate control. No complaints. Wishes to go home today.   No nausea or vomiting.    Recent Labs     02/24/24  0545 02/23/24  0840   WBC  --  9.5   HGB 11.6 10.7*   HCT  --  31.2*   MCV  --  89.7   PLT  --  205      Lab Results   Component Value Date     02/23/2024    K 4.1 02/23/2024     02/23/2024    CO2 18 (L) 02/23/2024    BUN 6 02/23/2024    CREATININE 0.6 02/23/2024    GLUCOSE 81 02/23/2024    CALCIUM 8.7 02/23/2024    PROT 5.9 (L) 02/23/2024    LABALBU 3.3 (L) 02/23/2024    BILITOT 0.2 02/23/2024    ALKPHOS 132 (H) 02/23/2024    AST 11 02/23/2024    ALT 6 02/23/2024    LABGLOM >60 02/23/2024    GFRAA >60 08/09/2021          Vital signs stable.  Afebrile  /68   Pulse 76   Temp 98.3 °F (36.8 °C) (Oral)   Resp 16   Ht 1.651 m (5' 5\")   Wt 85.3 kg (188 lb)   LMP 05/17/2023 (Exact Date)   SpO2 98%   Breastfeeding Unknown   BMI 31.28 kg/m²   BP Readings from Last 3 Encounters:   02/23/24 119/68   02/21/24 119/81   02/19/24 118/86      Lungs CTA  CV RRR  Abdomen soft, non-distended. Normal bowel sounds present. No rebound or guarding.  Fundus firm and 2 finger breadths below umbilicus  Extremities no clubbing, cyanosis, cords, erythema.  Perineum dry and intact.        A: Doing well postpartum. Stable.        P: Home this evening if meets discharge criteria with fever, bleeding, emboli, lifting, climbing, driving precautions.  She is to follow-up at the office in 6 weeks.  She indicates understanding of all instructions.

## 2024-02-24 NOTE — PLAN OF CARE
Problem: Vaginal Birth or  Section  Goal: Fetal and maternal status remain reassuring during the birth process  Description:  Birth OB-Pregnancy care plan goal which identifies if the fetal and maternal status remain reassuring during the birth process  Outcome: Progressing     Problem: Postpartum  Goal: Experiences normal postpartum course  Description:  Postpartum OB-Pregnancy care plan goal which identifies if the mother is experiencing a normal postpartum course  Outcome: Progressing     Problem: Postpartum  Goal: Appropriate maternal -  bonding  Description:  Postpartum OB-Pregnancy care plan goal which identifies if the mother and  are bonding appropriately  Outcome: Progressing     Problem: Postpartum  Goal: Establishment of infant feeding pattern  Description:  Postpartum OB-Pregnancy care plan goal which identifies if the mother is establishing a feeding pattern with their   Outcome: Progressing     Problem: Postpartum  Goal: Incisions, wounds, or drain sites healing without S/S of infection  Outcome: Progressing     Problem: Pain  Goal: Verbalizes/displays adequate comfort level or baseline comfort level  Outcome: Progressing     Problem: Safety - Adult  Goal: Free from fall injury  Outcome: Progressing     Problem: Discharge Planning  Goal: Discharge to home or other facility with appropriate resources  Outcome: Progressing

## 2024-02-24 NOTE — L&D DELIVERY NOTE
Chaz, Baby Pending Glenny [[id number]]      Labor Events      Cervical Ripening Date/Time:        Rupture Date/Time:                    Labor Event Times      Labor onset date/time:        Dilation complete date/time:  24 19:00:00 EST     Start pushing date/time:     Decision date/time (emergent ):            Delivery Details                  Cord                  Placenta           Lacerations           Blood Loss  Mother: Glenny Ware #[id number]     Start of Mother's Information      Delivery Blood Loss  24 0738 - 24 1938      None                 End of Mother's Information  Mother: Glenny Ware #[id number]                Delivery Providers    Delivering clinician:               Assessment          Skin Color:   Heart Rate:   Reflex Irritability:   Muscle Tone:   Respiratory Effort:   Total:            1 Minute:         5 Minute:                                                  Measurements                 Patient felt the urge to push.  She was placed in Warren position.    Bladder empty.    She was able to deliver the fetal head.    Fetal trunk was delivered with minimal traction applied in an axis parallel to the fetal spine after the shoulder had cleared the pubic bone.    Nares and hypopharynx suctioned.   Delayed cord clamping was performed.  Cord was clamped and cut.  Infant was crying and vigorous.    Cord gases and blood samples were obtained.  Placenta was removed spontaneously.    Note was made of an intact 2 artery 1 vein cord placenta.  Placenta was sent to pathology.    No cervical or vaginal lacerations were noted.    Fundus was firm.    Estimated blood loss: 300 cc.    No complications.    Sponge and instrument counts were correct.    Mother and infant went to recovery room in stable condition.    Cord arterial gas pH is 7.27 base excess -3.1.  Cord venous gas pH is 7.33 base excess -2.6.              Denisse Valdivia MD  2024  7:38 PM

## 2024-02-24 NOTE — PROGRESS NOTES
Assuming care of patient. RN monitoring fetal heart rate and uterine activity every 15 minutes while patient is in labor. Maternal perception of movement noted. Plan of care discussed with patient; patient verbalizes understanding. Call light in reach.

## 2024-02-24 NOTE — PROGRESS NOTES
Fetal heart rate and uterine activity being monitored every 5 minutes while pushing. RN continuously remains at bedside.

## 2024-02-24 NOTE — PROGRESS NOTES
Assumed care of pt for this shift.  Discussed plan of care for the shift as well as safe sleep practices.  Pt verbalizes understanding without questions at this time.  Pt denies any pain or discomfort at this time.  Call light within reach.

## 2024-02-26 LAB — RPR SER QL: NONREACTIVE

## 2024-02-28 LAB — SURGICAL PATHOLOGY REPORT: NORMAL

## 2024-03-01 ENCOUNTER — TELEPHONE (OUTPATIENT)
Age: 34
End: 2024-03-01

## 2024-03-01 NOTE — TELEPHONE ENCOUNTER
Pt states her and baby are doing well, nothing is needed at this time. Post partum appt date/ time reviewed with pt.

## 2024-03-01 NOTE — TELEPHONE ENCOUNTER
----- Message from Denisse Valdivia MD sent at 2/29/2024  9:30 AM EST -----  Please call patient.2/29/24  How is she? How is baby? Do they need anything?      Thank you

## 2024-04-06 DIAGNOSIS — Z3A.17 17 WEEKS GESTATION OF PREGNANCY: ICD-10-CM

## 2024-04-08 RX ORDER — LEVOTHYROXINE SODIUM 0.05 MG/1
50 TABLET ORAL DAILY
Qty: 90 TABLET | Refills: 0 | Status: SHIPPED | OUTPATIENT
Start: 2024-04-08

## 2024-04-11 ENCOUNTER — POSTPARTUM VISIT (OUTPATIENT)
Age: 34
End: 2024-04-11

## 2024-04-11 VITALS
BODY MASS INDEX: 28.79 KG/M2 | WEIGHT: 173 LBS | SYSTOLIC BLOOD PRESSURE: 111 MMHG | DIASTOLIC BLOOD PRESSURE: 73 MMHG | HEART RATE: 92 BPM

## 2024-04-11 DIAGNOSIS — D50.8 OTHER IRON DEFICIENCY ANEMIA: ICD-10-CM

## 2024-04-11 DIAGNOSIS — E03.9 HYPOTHYROIDISM, UNSPECIFIED TYPE: Primary | ICD-10-CM

## 2024-04-11 PROCEDURE — 99024 POSTOP FOLLOW-UP VISIT: CPT | Performed by: LEGAL MEDICINE

## 2024-04-11 RX ORDER — FLUOXETINE 10 MG/1
TABLET, FILM COATED ORAL
Qty: 30 TABLET | Refills: 5 | Status: SHIPPED | OUTPATIENT
Start: 2024-04-11

## 2024-04-11 ASSESSMENT — ENCOUNTER SYMPTOMS
ABDOMINAL DISTENTION: 0
VOMITING: 0
DIARRHEA: 0
ABDOMINAL PAIN: 0
RECTAL PAIN: 0
CONSTIPATION: 0
BLOOD IN STOOL: 0
NAUSEA: 0

## 2024-04-11 NOTE — PROGRESS NOTES
Glenny Ware     Patient presents for postpartum checkup.  No problems with her bowel or urinary function.  Does have anxiety, and wishes to start medication.  No suicidal ideation.  States had anxiety after her last delivery as well, and this time would like to start medication.  Declines appointment with therapist.  Also request that her 's FMLA be extended, so that he can help with care of the child as she is attempting to treat her anxiety disorder.    She states she has been on antidepressants before, but stopped them due to fatigue.  She is amenable to starting a low-dose of Prozac.          Past Medical History:   Diagnosis Date    Hypothyroidism 2020        Past Surgical History:   Procedure Laterality Date    TONSILLECTOMY      TONSILLECTOMY N/A         No family history on file.     Social History       Tobacco History       Smoking Status  Former Smoking Start Date  11/30/2010 Quit Date  11/30/2020 Average Packs/Day  0.3 packs/day for 10.0 years (2.5 ttl pk-yrs) Smoking Tobacco Type  Cigarettes from 11/30/2010 to 11/30/2020   Pack Year History     Packs/Day From To Years    0 11/30/2020  3.4    0.25 11/30/2010 11/30/2020 10.0      Smokeless Tobacco Use  Never              Alcohol History       Alcohol Use Status  Not Currently Drinks/Week  0 Glasses of wine, 0 Cans of beer, 0 Shots of liquor, 0 Drinks containing 0.5 oz of alcohol per week              Drug Use       Drug Use Status  Never              Sexual Activity       Sexually Active  Yes Partners  Male                      Current Outpatient Medications:     FLUoxetine (PROZAC) 10 MG tablet, One half tablet by mouth daily for 1 week, then 1 tablet by mouth daily., Disp: 30 tablet, Rfl: 5    levothyroxine (SYNTHROID) 50 MCG tablet, TAKE 1 TABLET BY MOUTH DAILY, Disp: 90 tablet, Rfl: 0    Prenatal MV-Min-Fe Fum-FA-DHA (PRENATAL 1 PO), Take by mouth, Disp: , Rfl:      Allergies   Allergen Reactions    Alavert [Loratadine]     Pcn

## 2024-04-29 ENCOUNTER — TELEPHONE (OUTPATIENT)
Age: 34
End: 2024-04-29

## 2024-04-29 NOTE — TELEPHONE ENCOUNTER
Pt brought in FMLA forms she wishes to have completed for her . FMLA forms have been completed by pt/  and are requesting a continuous 6 months of leave. Pt states this is due to her starting Prozac. Please confirm forms can be completed for 6 months of continuous leave for pt  and we will complete them and give them to  you for signature.

## 2024-04-29 NOTE — TELEPHONE ENCOUNTER
Due to psychiatric disorder, patient is worried about being left alone with the baby, and has requested extended leave for  to help her. You may complete the forms.

## 2024-09-25 ENCOUNTER — TELEPHONE (OUTPATIENT)
Age: 34
End: 2024-09-25

## 2024-10-03 LAB
T3FREE SERPL-MCNC: 2.65 PG/ML (ref 2–4.4)
T4 SERPL-MCNC: 6.3 UG/DL (ref 4.5–11.7)
TSH SERPL DL<=0.05 MIU/L-ACNC: 4.27 UIU/ML (ref 0.27–4.2)

## 2025-05-17 ENCOUNTER — OFFICE VISIT (OUTPATIENT)
Dept: URGENT CARE | Facility: URGENT CARE | Age: 35
End: 2025-05-17

## 2025-05-17 VITALS
DIASTOLIC BLOOD PRESSURE: 71 MMHG | HEART RATE: 73 BPM | SYSTOLIC BLOOD PRESSURE: 105 MMHG | WEIGHT: 165.34 LBS | TEMPERATURE: 98.2 F | OXYGEN SATURATION: 99 % | RESPIRATION RATE: 18 BRPM

## 2025-05-17 DIAGNOSIS — T50.905A ADVERSE EFFECT OF DRUG, INITIAL ENCOUNTER: ICD-10-CM

## 2025-05-17 DIAGNOSIS — L50.9 URTICARIA: ICD-10-CM

## 2025-05-17 DIAGNOSIS — T78.3XXA ANGIOEDEMA, INITIAL ENCOUNTER: Primary | ICD-10-CM

## 2025-05-17 PROCEDURE — 1036F TOBACCO NON-USER: CPT | Performed by: PHYSICIAN ASSISTANT

## 2025-05-17 RX ORDER — FLUOXETINE HYDROCHLORIDE 20 MG/1
1 CAPSULE ORAL
COMMUNITY
Start: 2024-12-18

## 2025-05-17 RX ORDER — DIPHENHYDRAMINE HCL 50 MG
50 CAPSULE ORAL ONCE
Status: COMPLETED | OUTPATIENT
Start: 2025-05-17 | End: 2025-05-17

## 2025-05-17 RX ORDER — METHYLPREDNISOLONE 4 MG/1
TABLET ORAL
Qty: 21 TABLET | Refills: 0 | Status: SHIPPED | OUTPATIENT
Start: 2025-05-17 | End: 2025-05-23

## 2025-05-17 RX ORDER — LEVOTHYROXINE SODIUM 50 UG/1
50 TABLET ORAL DAILY
COMMUNITY

## 2025-05-17 RX ORDER — METHYLPREDNISOLONE SODIUM SUCCINATE 125 MG/2ML
125 INJECTION INTRAMUSCULAR; INTRAVENOUS ONCE
Status: COMPLETED | OUTPATIENT
Start: 2025-05-17 | End: 2025-05-17

## 2025-05-17 RX ORDER — DIPHENHYDRAMINE HCL 50 MG
50 CAPSULE ORAL ONCE
Status: DISCONTINUED | OUTPATIENT
Start: 2025-05-17 | End: 2025-05-17

## 2025-05-17 RX ADMIN — METHYLPREDNISOLONE SODIUM SUCCINATE 125 MG: 125 INJECTION INTRAMUSCULAR; INTRAVENOUS at 11:36

## 2025-05-17 RX ADMIN — Medication 50 MG: at 11:35

## 2025-05-17 NOTE — PATIENT INSTRUCTIONS
Allergic reaction to mifepristone- advised to stop medication. Solumedrol 125mg IM once. Tomorrow start Medrol dose ivan take with food, monitor BP. Benadryl 50mg oral once; mild improvement in redness. Advised to continue Benadryl 50mg every 8hr until hives and redness subside. May apply cold compresses to face or itchy areas.   Advised ER if worsening hives, throat swelling, difficulty breathing or chest pain or nausea or vomiting or dizziness

## 2025-05-17 NOTE — PROGRESS NOTES
Subjective   Patient ID: Sabrina Reed is a 34 y.o. female with anxiety present with  today with a chief complaint of Other (Pt was given/took medical  pill (1st pill) yesterday around 3pm and then began developing hives/rash (inside legs, center of chest) and facial swelling/redness a few hrs later after taking allergy med. /Pt on Levothyroxine and concerned maybe drug reaction of combo is occurring.).    History of Present Illness  HPI  Pt had intercourse 2 weeks ago, missed period on Saturday a week ago, urine pregnancy positive. She went online had consult and was prescibed  Plan C with mifepristone. First pill at 3pm yesterday and by 7pm she felt hot and itchy with facial redness. This morning facial swelling in eyes and lips, tingling in mouth and rash spreading from face down to chest, and inner thighs. She did not take any benadryl. Pt denies breathing issues or wheeze or vomiting or dizziness. Pt upset due to  having vasectomy recently. She reports increased anxiety, takes prozac. She reports taking otc allegra in last week for seasonal allergies.    Past Medical History  Allergies as of 2025 - Reviewed 2025   Allergen Reaction Noted    Mifepristone Hives 2025    Penicillins Swelling 2025       Prescriptions Prior to Admission[1]     Medical History[2]    Surgical History[3]     reports that she has never smoked. She has never used smokeless tobacco.    Review of Systems  Review of Systems                               Objective    Vitals:    25 1054   BP: 105/71   BP Location: Left arm   Patient Position: Sitting   BP Cuff Size: Adult long   Pulse: 73   Resp: 18   Temp: 36.8 °C (98.2 °F)   TempSrc: Oral   SpO2: 99%   Weight: 75 kg (165 lb 5.5 oz)     Patient's last menstrual period was 2025.    Physical Exam  Constitutional:       Comments: Mildly anxious   HENT:      Head: Normocephalic and atraumatic.      Right Ear: Tympanic membrane normal.       Left Ear: Tympanic membrane normal.      Ears:      Comments: Air fluid levels on left     Nose: Nose normal.      Mouth/Throat:      Mouth: Mucous membranes are moist.      Pharynx: Posterior oropharyngeal erythema present. No oropharyngeal exudate.      Comments: Mild-moderate lip swelling, no tongue or throat swelling  Eyes:      Extraocular Movements: Extraocular movements intact.      Conjunctiva/sclera: Conjunctivae normal.      Pupils: Pupils are equal, round, and reactive to light.      Comments: minimal eyelid swelling   Cardiovascular:      Rate and Rhythm: Normal rate and regular rhythm.      Heart sounds: No murmur heard.  Pulmonary:      Effort: Pulmonary effort is normal. No respiratory distress.      Breath sounds: Normal breath sounds. No wheezing.   Abdominal:      General: Abdomen is flat. Bowel sounds are normal. There is no distension.      Palpations: Abdomen is soft.      Tenderness: There is no abdominal tenderness. There is no guarding.   Musculoskeletal:      Cervical back: Normal range of motion.   Lymphadenopathy:      Cervical: No cervical adenopathy.   Skin:     General: Skin is warm.      Comments: Diffuse facial redness, raised red blotches to neck, chest and back   Neurological:      General: No focal deficit present.      Mental Status: She is alert.      Motor: No weakness.         Procedures    Point of Care Test & Imaging Results from this visit  No results found for this visit on 25.   Imaging  No results found.    Cardiology, Vascular, and Other Imaging  No other imaging results found for the past 2 days      Diagnostic study results (if any) were reviewed by India Mckeon PA-C.    Assessment/Plan   Allergies, medications, history, and pertinent labs/EKGs/Imaging reviewed by India Mckeon PA-C.     Medical Decision Making   34 y.o. female with anxiety present with  today with a chief complaint of took medical  pill (1st pill) yesterday  around 3pm and then began developing hives/rash (inside legs, center of chest) and facial swelling/redness a few hrs later after taking allergy med. /Pt on Levothyroxine and concerned maybe drug reaction of combo is occurring.).  Reviewed vitals stable. Exam remarkable for nasal congestion, rhinorrhea, pharyngeal erythema without exudate, mildly diminished breath sounds without respiratory distress, Diffuse facial redness, raised red blotches to neck, chest and back    Discussed with supervising physician Dr Yee recommend allergic reaction protocol with benadryl, pepcid, allegra.  Allergic reaction to mifepristone- advised to stop medication. Solumedrol 125mg IM once. Tomorrow start Medrol dose ivan take with food, monitor BP. Benadryl 50mg oral once; mild improvement in redness and swelling 30 min after medications. Advised to continue Benadryl 50mg every 8hr until hives and redness subside. May apply cold compresses to face or itchy areas. Start pepcid 20mg twice a day x 1 week. Continue allegra daily.   Advised ER if worsening hives, throat swelling, difficulty breathing or chest pain or nausea or vomiting or dizziness    Orders and Diagnoses  Diagnoses and all orders for this visit:  Angioedema, initial encounter  -     methylPREDNISolone sodium succinate (PF) (SOLU-Medrol) injection 125 mg  -     diphenhydrAMINE (BENADryl) capsule 50 mg  -     methylPREDNISolone (Medrol Dospak) 4 mg tablets; Follow schedule on package instructions  Adverse effect of drug, initial encounter  -     methylPREDNISolone sodium succinate (PF) (SOLU-Medrol) injection 125 mg  -     diphenhydrAMINE (BENADryl) capsule 50 mg  -     methylPREDNISolone (Medrol Dospak) 4 mg tablets; Follow schedule on package instructions  Urticaria      Medical Admin Record  Administrations This Visit       diphenhydrAMINE (BENADryl) capsule 50 mg       Admin Date  05/17/2025 Action  Given Dose  50 mg Route  oral Documented By  Victoria Lui MA               methylPREDNISolone sodium succinate (PF) (SOLU-Medrol) injection 125 mg       Admin Date  05/17/2025 Action  Given Dose  125 mg Route  intramuscular Documented By  Victoria Lui MA                    Patient disposition: Home    Electronically signed by India Mckeon PA-C  12:29 PM           [1] (Not in a hospital admission)   [2] No past medical history on file.  [3] No past surgical history on file.